# Patient Record
Sex: FEMALE | Race: WHITE | NOT HISPANIC OR LATINO
[De-identification: names, ages, dates, MRNs, and addresses within clinical notes are randomized per-mention and may not be internally consistent; named-entity substitution may affect disease eponyms.]

---

## 2017-07-27 ENCOUNTER — APPOINTMENT (OUTPATIENT)
Dept: RHEUMATOLOGY | Facility: CLINIC | Age: 72
End: 2017-07-27
Payer: MEDICARE

## 2017-07-27 VITALS
OXYGEN SATURATION: 98 % | BODY MASS INDEX: 23.39 KG/M2 | WEIGHT: 137 LBS | SYSTOLIC BLOOD PRESSURE: 125 MMHG | HEIGHT: 64 IN | HEART RATE: 67 BPM | DIASTOLIC BLOOD PRESSURE: 81 MMHG

## 2017-07-27 DIAGNOSIS — Z82.61 FAMILY HISTORY OF ARTHRITIS: ICD-10-CM

## 2017-07-27 DIAGNOSIS — M79.643 PAIN IN UNSPECIFIED HAND: ICD-10-CM

## 2017-07-27 PROCEDURE — 99205 OFFICE O/P NEW HI 60 MIN: CPT

## 2017-08-06 ENCOUNTER — FORM ENCOUNTER (OUTPATIENT)
Age: 72
End: 2017-08-06

## 2017-08-07 ENCOUNTER — OUTPATIENT (OUTPATIENT)
Dept: OUTPATIENT SERVICES | Facility: HOSPITAL | Age: 72
LOS: 1 days | End: 2017-08-07
Payer: MEDICARE

## 2017-08-07 ENCOUNTER — APPOINTMENT (OUTPATIENT)
Dept: MRI IMAGING | Facility: CLINIC | Age: 72
End: 2017-08-07
Payer: MEDICARE

## 2017-08-07 DIAGNOSIS — Z00.8 ENCOUNTER FOR OTHER GENERAL EXAMINATION: ICD-10-CM

## 2017-08-07 PROCEDURE — 73218 MRI UPPER EXTREMITY W/O DYE: CPT

## 2017-08-07 PROCEDURE — 73218 MRI UPPER EXTREMITY W/O DYE: CPT | Mod: 26,RT

## 2017-08-10 LAB
ALBUMIN MFR SERPL ELPH: 62.7 %
ALBUMIN SERPL ELPH-MCNC: 4.4 G/DL
ALBUMIN SERPL-MCNC: 4.3 G/DL
ALBUMIN/GLOB SERPL: 1.7 RATIO
ALP BLD-CCNC: 58 U/L
ALPHA1 GLOB MFR SERPL ELPH: 4.1 %
ALPHA1 GLOB SERPL ELPH-MCNC: 0.3 G/DL
ALPHA2 GLOB MFR SERPL ELPH: 8.7 %
ALPHA2 GLOB SERPL ELPH-MCNC: 0.6 G/DL
ALT SERPL-CCNC: 22 U/L
ANA SER IF-ACNC: NEGATIVE
ANION GAP SERPL CALC-SCNC: 14 MMOL/L
AST SERPL-CCNC: 16 U/L
B-GLOBULIN MFR SERPL ELPH: 10.5 %
B-GLOBULIN SERPL ELPH-MCNC: 0.7 G/DL
BASOPHILS # BLD AUTO: 0.02 K/UL
BASOPHILS NFR BLD AUTO: 0.5 %
BILIRUB SERPL-MCNC: 0.4 MG/DL
BUN SERPL-MCNC: 15 MG/DL
CALCIUM SERPL-MCNC: 9.2 MG/DL
CCP AB SER IA-ACNC: 37 UNITS
CHLORIDE SERPL-SCNC: 103 MMOL/L
CO2 SERPL-SCNC: 27 MMOL/L
CREAT SERPL-MCNC: 0.78 MG/DL
CRP SERPL-MCNC: <0.2 MG/DL
DEPRECATED KAPPA LC FREE/LAMBDA SER: 1.02 RATIO
EOSINOPHIL # BLD AUTO: 0.07 K/UL
EOSINOPHIL NFR BLD AUTO: 1.7 %
ERYTHROCYTE [SEDIMENTATION RATE] IN BLOOD BY WESTERGREN METHOD: 9 MM/HR
GAMMA GLOB FLD ELPH-MCNC: 1 G/DL
GAMMA GLOB MFR SERPL ELPH: 14 %
GLUCOSE SERPL-MCNC: 109 MG/DL
HCT VFR BLD CALC: 41.3 %
HGB BLD-MCNC: 13 G/DL
IGA SER QL IEP: 153 MG/DL
IGG SER QL IEP: 955 MG/DL
IGM SER QL IEP: 63 MG/DL
IMM GRANULOCYTES NFR BLD AUTO: 0 %
INTERPRETATION SERPL IEP-IMP: NORMAL
KAPPA LC CSF-MCNC: 1.76 MG/DL
KAPPA LC SERPL-MCNC: 1.79 MG/DL
LDH SERPL-CCNC: 95 U/L
LYMPHOCYTES # BLD AUTO: 1.25 K/UL
LYMPHOCYTES NFR BLD AUTO: 29.8 %
M PROTEIN SPEC IFE-MCNC: NORMAL
MAN DIFF?: NORMAL
MCHC RBC-ENTMCNC: 30.7 PG
MCHC RBC-ENTMCNC: 31.5 GM/DL
MCV RBC AUTO: 97.6 FL
MONOCYTES # BLD AUTO: 0.41 K/UL
MONOCYTES NFR BLD AUTO: 9.8 %
NEUTROPHILS # BLD AUTO: 2.44 K/UL
NEUTROPHILS NFR BLD AUTO: 58.2 %
PLATELET # BLD AUTO: 239 K/UL
POTASSIUM SERPL-SCNC: 4.3 MMOL/L
PROT SERPL-MCNC: 6.9 G/DL
RBC # BLD: 4.23 M/UL
RBC # FLD: 14 %
RF+CCP IGG SER-IMP: ABNORMAL
RHEUMATOID FACT SER QL: 7 IU/ML
SODIUM SERPL-SCNC: 144 MMOL/L
WBC # FLD AUTO: 4.19 K/UL

## 2017-08-11 ENCOUNTER — TRANSCRIPTION ENCOUNTER (OUTPATIENT)
Age: 72
End: 2017-08-11

## 2017-08-14 ENCOUNTER — APPOINTMENT (OUTPATIENT)
Dept: ULTRASOUND IMAGING | Facility: CLINIC | Age: 72
End: 2017-08-14
Payer: MEDICARE

## 2017-08-14 ENCOUNTER — OUTPATIENT (OUTPATIENT)
Dept: OUTPATIENT SERVICES | Facility: HOSPITAL | Age: 72
LOS: 1 days | End: 2017-08-14
Payer: MEDICARE

## 2017-08-14 DIAGNOSIS — Z00.8 ENCOUNTER FOR OTHER GENERAL EXAMINATION: ICD-10-CM

## 2017-08-14 PROCEDURE — 76700 US EXAM ABDOM COMPLETE: CPT

## 2017-08-14 PROCEDURE — 76700 US EXAM ABDOM COMPLETE: CPT | Mod: 26

## 2017-09-28 ENCOUNTER — APPOINTMENT (OUTPATIENT)
Dept: RHEUMATOLOGY | Facility: CLINIC | Age: 72
End: 2017-09-28
Payer: MEDICARE

## 2017-09-28 VITALS
DIASTOLIC BLOOD PRESSURE: 67 MMHG | HEART RATE: 67 BPM | BODY MASS INDEX: 22.88 KG/M2 | TEMPERATURE: 98.2 F | SYSTOLIC BLOOD PRESSURE: 107 MMHG | HEIGHT: 64 IN | OXYGEN SATURATION: 98 % | WEIGHT: 134 LBS

## 2017-09-28 PROCEDURE — 99214 OFFICE O/P EST MOD 30 MIN: CPT

## 2017-09-29 LAB
ALBUMIN SERPL ELPH-MCNC: 4.3 G/DL
ALP BLD-CCNC: 49 U/L
ALT SERPL-CCNC: 14 U/L
ANION GAP SERPL CALC-SCNC: 13 MMOL/L
AST SERPL-CCNC: 19 U/L
BASOPHILS # BLD AUTO: 0.02 K/UL
BASOPHILS NFR BLD AUTO: 0.4 %
BILIRUB SERPL-MCNC: 0.4 MG/DL
BUN SERPL-MCNC: 17 MG/DL
CALCIUM SERPL-MCNC: 9.5 MG/DL
CHLORIDE SERPL-SCNC: 101 MMOL/L
CO2 SERPL-SCNC: 28 MMOL/L
CREAT SERPL-MCNC: 1 MG/DL
CRP SERPL-MCNC: <0.2 MG/DL
EOSINOPHIL # BLD AUTO: 0.07 K/UL
EOSINOPHIL NFR BLD AUTO: 1.3 %
ERYTHROCYTE [SEDIMENTATION RATE] IN BLOOD BY WESTERGREN METHOD: 7 MM/HR
GLUCOSE SERPL-MCNC: 103 MG/DL
HCT VFR BLD CALC: 39.4 %
HGB BLD-MCNC: 12.9 G/DL
IMM GRANULOCYTES NFR BLD AUTO: 0.2 %
LYMPHOCYTES # BLD AUTO: 1.74 K/UL
LYMPHOCYTES NFR BLD AUTO: 31.9 %
MAN DIFF?: NORMAL
MCHC RBC-ENTMCNC: 32 PG
MCHC RBC-ENTMCNC: 32.7 GM/DL
MCV RBC AUTO: 97.8 FL
MONOCYTES # BLD AUTO: 0.51 K/UL
MONOCYTES NFR BLD AUTO: 9.3 %
NEUTROPHILS # BLD AUTO: 3.11 K/UL
NEUTROPHILS NFR BLD AUTO: 56.9 %
PLATELET # BLD AUTO: 241 K/UL
POTASSIUM SERPL-SCNC: 3.6 MMOL/L
PROT SERPL-MCNC: 7.3 G/DL
RBC # BLD: 4.03 M/UL
RBC # FLD: 13.6 %
SODIUM SERPL-SCNC: 142 MMOL/L
WBC # FLD AUTO: 5.46 K/UL

## 2017-11-15 ENCOUNTER — RX RENEWAL (OUTPATIENT)
Age: 72
End: 2017-11-15

## 2017-11-28 ENCOUNTER — APPOINTMENT (OUTPATIENT)
Dept: RHEUMATOLOGY | Facility: CLINIC | Age: 72
End: 2017-11-28
Payer: MEDICARE

## 2017-11-28 VITALS
SYSTOLIC BLOOD PRESSURE: 115 MMHG | BODY MASS INDEX: 23.22 KG/M2 | OXYGEN SATURATION: 98 % | HEART RATE: 74 BPM | TEMPERATURE: 97.7 F | DIASTOLIC BLOOD PRESSURE: 76 MMHG | WEIGHT: 136 LBS | HEIGHT: 64 IN

## 2017-11-28 DIAGNOSIS — M54.9 DORSALGIA, UNSPECIFIED: ICD-10-CM

## 2017-11-28 PROCEDURE — 99214 OFFICE O/P EST MOD 30 MIN: CPT

## 2017-11-29 LAB
ALBUMIN SERPL ELPH-MCNC: 4 G/DL
ALP BLD-CCNC: 43 U/L
ALT SERPL-CCNC: 18 U/L
ANION GAP SERPL CALC-SCNC: 15 MMOL/L
AST SERPL-CCNC: 21 U/L
BASOPHILS # BLD AUTO: 0.04 K/UL
BASOPHILS NFR BLD AUTO: 1 %
BILIRUB SERPL-MCNC: 0.3 MG/DL
BUN SERPL-MCNC: 21 MG/DL
CALCIUM SERPL-MCNC: 9.1 MG/DL
CHLORIDE SERPL-SCNC: 103 MMOL/L
CO2 SERPL-SCNC: 27 MMOL/L
CREAT SERPL-MCNC: 0.75 MG/DL
CRP SERPL-MCNC: <0.2 MG/DL
EOSINOPHIL # BLD AUTO: 0.11 K/UL
EOSINOPHIL NFR BLD AUTO: 2.7 %
ERYTHROCYTE [SEDIMENTATION RATE] IN BLOOD BY WESTERGREN METHOD: 9 MM/HR
GLUCOSE SERPL-MCNC: 107 MG/DL
HCT VFR BLD CALC: 39.2 %
HGB BLD-MCNC: 12.4 G/DL
IMM GRANULOCYTES NFR BLD AUTO: 0 %
LYMPHOCYTES # BLD AUTO: 1.27 K/UL
LYMPHOCYTES NFR BLD AUTO: 30.8 %
MAN DIFF?: NORMAL
MCHC RBC-ENTMCNC: 31.6 GM/DL
MCHC RBC-ENTMCNC: 32.2 PG
MCV RBC AUTO: 101.8 FL
MONOCYTES # BLD AUTO: 0.34 K/UL
MONOCYTES NFR BLD AUTO: 8.2 %
NEUTROPHILS # BLD AUTO: 2.37 K/UL
NEUTROPHILS NFR BLD AUTO: 57.3 %
PLATELET # BLD AUTO: 215 K/UL
POTASSIUM SERPL-SCNC: 4.2 MMOL/L
PROT SERPL-MCNC: 7.1 G/DL
RBC # BLD: 3.85 M/UL
RBC # FLD: 15.1 %
SODIUM SERPL-SCNC: 145 MMOL/L
WBC # FLD AUTO: 4.13 K/UL

## 2017-12-01 ENCOUNTER — FORM ENCOUNTER (OUTPATIENT)
Age: 72
End: 2017-12-01

## 2017-12-02 ENCOUNTER — OUTPATIENT (OUTPATIENT)
Dept: OUTPATIENT SERVICES | Facility: HOSPITAL | Age: 72
LOS: 1 days | End: 2017-12-02
Payer: MEDICARE

## 2017-12-02 ENCOUNTER — APPOINTMENT (OUTPATIENT)
Dept: MRI IMAGING | Facility: CLINIC | Age: 72
End: 2017-12-02
Payer: MEDICARE

## 2017-12-02 DIAGNOSIS — M54.9 DORSALGIA, UNSPECIFIED: ICD-10-CM

## 2017-12-02 PROCEDURE — 73221 MRI JOINT UPR EXTREM W/O DYE: CPT | Mod: 26,RT

## 2017-12-02 PROCEDURE — 73221 MRI JOINT UPR EXTREM W/O DYE: CPT

## 2017-12-13 ENCOUNTER — APPOINTMENT (OUTPATIENT)
Dept: RHEUMATOLOGY | Facility: CLINIC | Age: 72
End: 2017-12-13

## 2017-12-13 ENCOUNTER — APPOINTMENT (OUTPATIENT)
Dept: RHEUMATOLOGY | Facility: CLINIC | Age: 72
End: 2017-12-13
Payer: MEDICARE

## 2017-12-13 VITALS
HEIGHT: 64 IN | WEIGHT: 136 LBS | SYSTOLIC BLOOD PRESSURE: 116 MMHG | RESPIRATION RATE: 16 BRPM | OXYGEN SATURATION: 98 % | DIASTOLIC BLOOD PRESSURE: 72 MMHG | TEMPERATURE: 97.8 F | HEART RATE: 76 BPM | BODY MASS INDEX: 23.22 KG/M2

## 2017-12-13 PROCEDURE — 99214 OFFICE O/P EST MOD 30 MIN: CPT

## 2017-12-13 RX ORDER — NEOMYCIN SULFATE, POLYMYXIN B SULFATE AND DEXAMETHASONE 3.5; 10000; 1 MG/ML; [USP'U]/ML; MG/ML
3.5-10000-0.1 SUSPENSION OPHTHALMIC
Qty: 5 | Refills: 0 | Status: COMPLETED | COMMUNITY
Start: 2017-11-12

## 2017-12-13 RX ORDER — METHYLPREDNISOLONE 4 MG/1
4 TABLET ORAL
Qty: 21 | Refills: 0 | Status: COMPLETED | COMMUNITY
Start: 2017-12-10

## 2017-12-13 RX ORDER — CEPHALEXIN 500 MG/1
500 CAPSULE ORAL
Qty: 14 | Refills: 0 | Status: COMPLETED | COMMUNITY
Start: 2017-07-12

## 2017-12-13 RX ORDER — ZOLPIDEM TARTRATE 10 MG/1
10 TABLET ORAL
Qty: 30 | Refills: 0 | Status: COMPLETED | COMMUNITY
Start: 2017-11-20

## 2017-12-14 LAB
ALBUMIN SERPL ELPH-MCNC: 4.6 G/DL
ALP BLD-CCNC: 54 U/L
ALT SERPL-CCNC: 18 U/L
ANION GAP SERPL CALC-SCNC: 11 MMOL/L
AST SERPL-CCNC: 17 U/L
BASOPHILS # BLD AUTO: 0.03 K/UL
BASOPHILS NFR BLD AUTO: 0.5 %
BILIRUB SERPL-MCNC: 0.3 MG/DL
BUN SERPL-MCNC: 21 MG/DL
CALCIUM SERPL-MCNC: 9.9 MG/DL
CHLORIDE SERPL-SCNC: 101 MMOL/L
CO2 SERPL-SCNC: 30 MMOL/L
CREAT SERPL-MCNC: 0.73 MG/DL
EOSINOPHIL # BLD AUTO: 0.11 K/UL
EOSINOPHIL NFR BLD AUTO: 1.8 %
GLUCOSE SERPL-MCNC: 106 MG/DL
HCT VFR BLD CALC: 39.5 %
HGB BLD-MCNC: 12.7 G/DL
IMM GRANULOCYTES NFR BLD AUTO: 0.2 %
LYMPHOCYTES # BLD AUTO: 1.77 K/UL
LYMPHOCYTES NFR BLD AUTO: 28.2 %
MAN DIFF?: NORMAL
MCHC RBC-ENTMCNC: 32.2 GM/DL
MCHC RBC-ENTMCNC: 32.2 PG
MCV RBC AUTO: 100 FL
MONOCYTES # BLD AUTO: 0.56 K/UL
MONOCYTES NFR BLD AUTO: 8.9 %
NEUTROPHILS # BLD AUTO: 3.8 K/UL
NEUTROPHILS NFR BLD AUTO: 60.4 %
PLATELET # BLD AUTO: 279 K/UL
POTASSIUM SERPL-SCNC: 4.4 MMOL/L
PROT SERPL-MCNC: 7.1 G/DL
RBC # BLD: 3.95 M/UL
RBC # FLD: 14.2 %
SODIUM SERPL-SCNC: 142 MMOL/L
WBC # FLD AUTO: 6.28 K/UL

## 2017-12-15 ENCOUNTER — APPOINTMENT (OUTPATIENT)
Dept: RHEUMATOLOGY | Facility: CLINIC | Age: 72
End: 2017-12-15

## 2017-12-21 ENCOUNTER — APPOINTMENT (OUTPATIENT)
Dept: RHEUMATOLOGY | Facility: CLINIC | Age: 72
End: 2017-12-21
Payer: MEDICARE

## 2017-12-21 VITALS
OXYGEN SATURATION: 98 % | DIASTOLIC BLOOD PRESSURE: 90 MMHG | RESPIRATION RATE: 16 BRPM | BODY MASS INDEX: 23.22 KG/M2 | TEMPERATURE: 97.8 F | HEART RATE: 80 BPM | WEIGHT: 136 LBS | HEIGHT: 64 IN | SYSTOLIC BLOOD PRESSURE: 150 MMHG

## 2017-12-21 PROCEDURE — 99214 OFFICE O/P EST MOD 30 MIN: CPT

## 2017-12-21 RX ORDER — PREDNISONE 5 MG/1
5 TABLET ORAL
Qty: 45 | Refills: 0 | Status: DISCONTINUED | COMMUNITY
Start: 2017-12-13 | End: 2017-12-21

## 2018-01-02 ENCOUNTER — TRANSCRIPTION ENCOUNTER (OUTPATIENT)
Age: 73
End: 2018-01-02

## 2018-01-02 LAB — G6PD SER-CCNC: 14.4 U/G HGB

## 2018-01-08 ENCOUNTER — CLINICAL ADVICE (OUTPATIENT)
Age: 73
End: 2018-01-08

## 2018-01-16 ENCOUNTER — APPOINTMENT (OUTPATIENT)
Dept: RHEUMATOLOGY | Facility: CLINIC | Age: 73
End: 2018-01-16
Payer: MEDICARE

## 2018-01-16 VITALS
SYSTOLIC BLOOD PRESSURE: 136 MMHG | BODY MASS INDEX: 23.22 KG/M2 | HEART RATE: 78 BPM | WEIGHT: 136 LBS | HEIGHT: 64 IN | RESPIRATION RATE: 16 BRPM | DIASTOLIC BLOOD PRESSURE: 79 MMHG | TEMPERATURE: 97.8 F | OXYGEN SATURATION: 98 %

## 2018-01-16 DIAGNOSIS — M25.512 PAIN IN LEFT SHOULDER: ICD-10-CM

## 2018-01-16 PROCEDURE — 96372 THER/PROPH/DIAG INJ SC/IM: CPT

## 2018-01-16 PROCEDURE — 99214 OFFICE O/P EST MOD 30 MIN: CPT | Mod: 25

## 2018-01-16 RX ORDER — LIDOCAINE HYDROCHLORIDE 10 MG/ML
1 INJECTION, SOLUTION INFILTRATION; PERINEURAL
Refills: 0 | Status: COMPLETED | OUTPATIENT
Start: 2018-01-16

## 2018-01-16 RX ORDER — METHYLPRED ACET/NACL,ISO-OS/PF 40 MG/ML
40 VIAL (ML) INJECTION
Qty: 1 | Refills: 0 | Status: COMPLETED | OUTPATIENT
Start: 2018-01-16

## 2018-01-16 RX ADMIN — LIDOCAINE HYDROCHLORIDE %: 10 INJECTION, SOLUTION INFILTRATION; PERINEURAL at 00:00

## 2018-01-16 RX ADMIN — METHYLPREDNISOLONE ACETATE 1 MG/ML: 40 INJECTION, SUSPENSION INTRA-ARTICULAR; INTRALESIONAL; INTRAMUSCULAR; SOFT TISSUE at 00:00

## 2018-03-06 ENCOUNTER — APPOINTMENT (OUTPATIENT)
Dept: RHEUMATOLOGY | Facility: CLINIC | Age: 73
End: 2018-03-06
Payer: MEDICARE

## 2018-03-06 DIAGNOSIS — M18.11 UNILATERAL PRIMARY OSTEOARTHRITIS OF FIRST CARPOMETACARPAL JOINT, RIGHT HAND: ICD-10-CM

## 2018-03-06 LAB
ALBUMIN SERPL ELPH-MCNC: 4.5 G/DL
ALP BLD-CCNC: 56 U/L
ALT SERPL-CCNC: 21 U/L
ANION GAP SERPL CALC-SCNC: 13 MMOL/L
AST SERPL-CCNC: 19 U/L
BASOPHILS # BLD AUTO: 0.03 K/UL
BASOPHILS NFR BLD AUTO: 0.7 %
BILIRUB SERPL-MCNC: 0.3 MG/DL
BUN SERPL-MCNC: 24 MG/DL
CALCIUM SERPL-MCNC: 9.5 MG/DL
CHLORIDE SERPL-SCNC: 102 MMOL/L
CO2 SERPL-SCNC: 26 MMOL/L
CREAT SERPL-MCNC: 0.88 MG/DL
CRP SERPL-MCNC: 1.1 MG/DL
EOSINOPHIL # BLD AUTO: 0.1 K/UL
EOSINOPHIL NFR BLD AUTO: 2.4 %
ERYTHROCYTE [SEDIMENTATION RATE] IN BLOOD BY WESTERGREN METHOD: 25 MM/HR
GLUCOSE SERPL-MCNC: 142 MG/DL
HCT VFR BLD CALC: 37.5 %
HGB BLD-MCNC: 12.1 G/DL
IMM GRANULOCYTES NFR BLD AUTO: 0.2 %
LYMPHOCYTES # BLD AUTO: 0.81 K/UL
LYMPHOCYTES NFR BLD AUTO: 19.5 %
MAN DIFF?: NORMAL
MCHC RBC-ENTMCNC: 32.3 GM/DL
MCHC RBC-ENTMCNC: 32.6 PG
MCV RBC AUTO: 101.1 FL
MONOCYTES # BLD AUTO: 0.43 K/UL
MONOCYTES NFR BLD AUTO: 10.4 %
NEUTROPHILS # BLD AUTO: 2.77 K/UL
NEUTROPHILS NFR BLD AUTO: 66.8 %
PLATELET # BLD AUTO: 257 K/UL
POTASSIUM SERPL-SCNC: 4.4 MMOL/L
PROT SERPL-MCNC: 6.7 G/DL
RBC # BLD: 3.71 M/UL
RBC # FLD: 14.4 %
SODIUM SERPL-SCNC: 141 MMOL/L
WBC # FLD AUTO: 4.15 K/UL

## 2018-03-06 PROCEDURE — 99214 OFFICE O/P EST MOD 30 MIN: CPT

## 2018-03-06 RX ORDER — PREDNISONE 5 MG/1
5 TABLET ORAL
Qty: 90 | Refills: 0 | Status: DISCONTINUED | COMMUNITY
Start: 2017-12-21 | End: 2018-03-06

## 2018-03-06 RX ORDER — IBUPROFEN 200 MG/1
200 TABLET, COATED ORAL
Qty: 180 | Refills: 0 | Status: DISCONTINUED | OUTPATIENT
Start: 2017-11-28 | End: 2018-03-06

## 2018-04-04 ENCOUNTER — RX RENEWAL (OUTPATIENT)
Age: 73
End: 2018-04-04

## 2018-04-30 ENCOUNTER — APPOINTMENT (OUTPATIENT)
Dept: RHEUMATOLOGY | Facility: CLINIC | Age: 73
End: 2018-04-30
Payer: MEDICARE

## 2018-04-30 VITALS
HEART RATE: 79 BPM | TEMPERATURE: 98.3 F | WEIGHT: 141 LBS | HEIGHT: 64 IN | DIASTOLIC BLOOD PRESSURE: 69 MMHG | BODY MASS INDEX: 24.07 KG/M2 | OXYGEN SATURATION: 97 % | SYSTOLIC BLOOD PRESSURE: 125 MMHG

## 2018-04-30 PROCEDURE — 99214 OFFICE O/P EST MOD 30 MIN: CPT

## 2018-05-03 LAB
ADJUSTED MITOGEN: >10 IU/ML
ADJUSTED TB AG: 0 IU/ML
ALBUMIN SERPL ELPH-MCNC: 4.4 G/DL
ALP BLD-CCNC: 59 U/L
ALT SERPL-CCNC: 19 U/L
ANION GAP SERPL CALC-SCNC: 10 MMOL/L
AST SERPL-CCNC: 20 U/L
BASOPHILS # BLD AUTO: 0.03 K/UL
BASOPHILS NFR BLD AUTO: 0.6 %
BILIRUB SERPL-MCNC: 0.3 MG/DL
BUN SERPL-MCNC: 23 MG/DL
CALCIUM SERPL-MCNC: 9.2 MG/DL
CHLORIDE SERPL-SCNC: 103 MMOL/L
CO2 SERPL-SCNC: 29 MMOL/L
CREAT SERPL-MCNC: 0.69 MG/DL
CRP SERPL-MCNC: <0.2 MG/DL
EOSINOPHIL # BLD AUTO: 0.13 K/UL
EOSINOPHIL NFR BLD AUTO: 2.7 %
GLUCOSE SERPL-MCNC: 107 MG/DL
HAV IGM SER QL: NONREACTIVE
HBV CORE IGG+IGM SER QL: NONREACTIVE
HBV CORE IGM SER QL: NONREACTIVE
HBV SURFACE AG SER QL: NONREACTIVE
HCT VFR BLD CALC: 38.1 %
HCV AB SER QL: NONREACTIVE
HCV S/CO RATIO: 0.09 S/CO
HGB BLD-MCNC: 12.1 G/DL
IMM GRANULOCYTES NFR BLD AUTO: 0.2 %
LYMPHOCYTES # BLD AUTO: 1.22 K/UL
LYMPHOCYTES NFR BLD AUTO: 25.5 %
M TB IFN-G BLD-IMP: NEGATIVE
MAN DIFF?: NORMAL
MCHC RBC-ENTMCNC: 31.8 GM/DL
MCHC RBC-ENTMCNC: 32.5 PG
MCV RBC AUTO: 102.4 FL
MONOCYTES # BLD AUTO: 0.42 K/UL
MONOCYTES NFR BLD AUTO: 8.8 %
NEUTROPHILS # BLD AUTO: 2.98 K/UL
NEUTROPHILS NFR BLD AUTO: 62.2 %
PLATELET # BLD AUTO: 244 K/UL
POTASSIUM SERPL-SCNC: 4.2 MMOL/L
PROT SERPL-MCNC: 6.7 G/DL
QUANTIFERON GOLD NIL: 0.04 IU/ML
RBC # BLD: 3.72 M/UL
RBC # FLD: 14.2 %
SODIUM SERPL-SCNC: 142 MMOL/L
WBC # FLD AUTO: 4.79 K/UL

## 2018-05-24 ENCOUNTER — LABORATORY RESULT (OUTPATIENT)
Age: 73
End: 2018-05-24

## 2018-05-24 ENCOUNTER — APPOINTMENT (OUTPATIENT)
Dept: RHEUMATOLOGY | Facility: CLINIC | Age: 73
End: 2018-05-24
Payer: MEDICARE

## 2018-05-24 PROCEDURE — 96415 CHEMO IV INFUSION ADDL HR: CPT

## 2018-05-24 PROCEDURE — 96375 TX/PRO/DX INJ NEW DRUG ADDON: CPT

## 2018-05-24 PROCEDURE — 36415 COLL VENOUS BLD VENIPUNCTURE: CPT

## 2018-05-24 PROCEDURE — 96413 CHEMO IV INFUSION 1 HR: CPT

## 2018-05-25 ENCOUNTER — OTHER (OUTPATIENT)
Age: 73
End: 2018-05-25

## 2018-05-27 ENCOUNTER — TRANSCRIPTION ENCOUNTER (OUTPATIENT)
Age: 73
End: 2018-05-27

## 2018-05-31 ENCOUNTER — RX RENEWAL (OUTPATIENT)
Age: 73
End: 2018-05-31

## 2018-06-04 ENCOUNTER — CLINICAL ADVICE (OUTPATIENT)
Age: 73
End: 2018-06-04

## 2018-06-07 ENCOUNTER — APPOINTMENT (OUTPATIENT)
Dept: RHEUMATOLOGY | Facility: CLINIC | Age: 73
End: 2018-06-07

## 2018-06-08 ENCOUNTER — APPOINTMENT (OUTPATIENT)
Dept: RHEUMATOLOGY | Facility: CLINIC | Age: 73
End: 2018-06-08

## 2018-06-11 ENCOUNTER — LABORATORY RESULT (OUTPATIENT)
Age: 73
End: 2018-06-11

## 2018-06-11 ENCOUNTER — APPOINTMENT (OUTPATIENT)
Dept: RHEUMATOLOGY | Facility: CLINIC | Age: 73
End: 2018-06-11

## 2018-06-11 ENCOUNTER — APPOINTMENT (OUTPATIENT)
Dept: RHEUMATOLOGY | Facility: CLINIC | Age: 73
End: 2018-06-11
Payer: MEDICARE

## 2018-06-11 PROCEDURE — 96415 CHEMO IV INFUSION ADDL HR: CPT

## 2018-06-11 PROCEDURE — 36415 COLL VENOUS BLD VENIPUNCTURE: CPT

## 2018-06-11 PROCEDURE — 96375 TX/PRO/DX INJ NEW DRUG ADDON: CPT

## 2018-06-11 PROCEDURE — 96413 CHEMO IV INFUSION 1 HR: CPT

## 2018-06-21 ENCOUNTER — RX RENEWAL (OUTPATIENT)
Age: 73
End: 2018-06-21

## 2018-06-22 ENCOUNTER — APPOINTMENT (OUTPATIENT)
Dept: RHEUMATOLOGY | Facility: CLINIC | Age: 73
End: 2018-06-22
Payer: MEDICARE

## 2018-06-22 VITALS
BODY MASS INDEX: 24.75 KG/M2 | HEART RATE: 80 BPM | DIASTOLIC BLOOD PRESSURE: 84 MMHG | HEIGHT: 64 IN | TEMPERATURE: 98.4 F | RESPIRATION RATE: 16 BRPM | OXYGEN SATURATION: 97 % | WEIGHT: 145 LBS | SYSTOLIC BLOOD PRESSURE: 134 MMHG

## 2018-06-22 DIAGNOSIS — M54.12 RADICULOPATHY, CERVICAL REGION: ICD-10-CM

## 2018-06-22 DIAGNOSIS — M79.643 PAIN IN UNSPECIFIED HAND: ICD-10-CM

## 2018-06-22 PROCEDURE — 99215 OFFICE O/P EST HI 40 MIN: CPT

## 2018-06-22 RX ORDER — NYSTATIN 100000 [USP'U]/ML
100000 SUSPENSION ORAL
Qty: 160 | Refills: 0 | Status: COMPLETED | COMMUNITY
Start: 2018-05-27

## 2018-06-22 RX ORDER — AZITHROMYCIN 250 MG/1
250 TABLET, FILM COATED ORAL
Qty: 6 | Refills: 0 | Status: COMPLETED | COMMUNITY
Start: 2018-05-29

## 2018-06-22 RX ORDER — MELOXICAM 7.5 MG/1
7.5 TABLET ORAL
Qty: 180 | Refills: 0 | Status: DISCONTINUED | COMMUNITY
Start: 2018-03-06 | End: 2018-06-22

## 2018-06-25 LAB
ALBUMIN SERPL ELPH-MCNC: 4.5 G/DL
ALP BLD-CCNC: 55 U/L
ALT SERPL-CCNC: 15 U/L
ANION GAP SERPL CALC-SCNC: 15 MMOL/L
AST SERPL-CCNC: 18 U/L
BASOPHILS # BLD AUTO: 0.04 K/UL
BASOPHILS NFR BLD AUTO: 0.8 %
BILIRUB SERPL-MCNC: 0.4 MG/DL
BUN SERPL-MCNC: 15 MG/DL
CALCIUM SERPL-MCNC: 9.8 MG/DL
CELLS.CD3-CD19+/CELLS IN BLOOD: 0 %
CHLORIDE SERPL-SCNC: 100 MMOL/L
CO2 SERPL-SCNC: 27 MMOL/L
CREAT SERPL-MCNC: 0.85 MG/DL
CRP SERPL-MCNC: <0.2 MG/DL
EOSINOPHIL # BLD AUTO: 0.05 K/UL
EOSINOPHIL NFR BLD AUTO: 1 %
ERYTHROCYTE [SEDIMENTATION RATE] IN BLOOD BY WESTERGREN METHOD: 6 MM/HR
GLUCOSE SERPL-MCNC: 92 MG/DL
HCT VFR BLD CALC: 37.6 %
HGB BLD-MCNC: 12.4 G/DL
IMM GRANULOCYTES NFR BLD AUTO: 0 %
LYMPHOCYTES # BLD AUTO: 1.44 K/UL
LYMPHOCYTES NFR BLD AUTO: 28.7 %
MAN DIFF?: NORMAL
MCHC RBC-ENTMCNC: 32 PG
MCHC RBC-ENTMCNC: 33 GM/DL
MCV RBC AUTO: 96.9 FL
MONOCYTES # BLD AUTO: 0.38 K/UL
MONOCYTES NFR BLD AUTO: 7.6 %
NEUTROPHILS # BLD AUTO: 3.1 K/UL
NEUTROPHILS NFR BLD AUTO: 61.9 %
PLATELET # BLD AUTO: 237 K/UL
POTASSIUM SERPL-SCNC: 4.2 MMOL/L
PROT SERPL-MCNC: 7 G/DL
RBC # BLD: 3.88 M/UL
RBC # FLD: 15.4 %
SODIUM SERPL-SCNC: 142 MMOL/L
WBC # FLD AUTO: 5.01 K/UL

## 2018-07-26 ENCOUNTER — FORM ENCOUNTER (OUTPATIENT)
Age: 73
End: 2018-07-26

## 2018-07-27 ENCOUNTER — APPOINTMENT (OUTPATIENT)
Dept: RHEUMATOLOGY | Facility: CLINIC | Age: 73
End: 2018-07-27
Payer: MEDICARE

## 2018-07-27 VITALS
SYSTOLIC BLOOD PRESSURE: 145 MMHG | RESPIRATION RATE: 16 BRPM | OXYGEN SATURATION: 97 % | WEIGHT: 143 LBS | HEART RATE: 84 BPM | TEMPERATURE: 98 F | BODY MASS INDEX: 24.41 KG/M2 | HEIGHT: 64 IN | DIASTOLIC BLOOD PRESSURE: 78 MMHG

## 2018-07-27 PROCEDURE — 99215 OFFICE O/P EST HI 40 MIN: CPT

## 2018-07-27 PROCEDURE — 71046 X-RAY EXAM CHEST 2 VIEWS: CPT

## 2018-07-27 RX ORDER — PREDNISONE 2.5 MG/1
2.5 TABLET ORAL
Qty: 42 | Refills: 1 | Status: DISCONTINUED | COMMUNITY
Start: 2018-04-30 | End: 2018-07-27

## 2018-07-28 DIAGNOSIS — J40 BRONCHITIS, NOT SPECIFIED AS ACUTE OR CHRONIC: ICD-10-CM

## 2018-07-28 LAB
ALBUMIN SERPL ELPH-MCNC: 4.4 G/DL
ALP BLD-CCNC: 55 U/L
ALT SERPL-CCNC: 34 U/L
ANION GAP SERPL CALC-SCNC: 15 MMOL/L
AST SERPL-CCNC: 25 U/L
BASOPHILS # BLD AUTO: 0.02 K/UL
BASOPHILS NFR BLD AUTO: 0.2 %
BILIRUB SERPL-MCNC: 0.3 MG/DL
BUN SERPL-MCNC: 21 MG/DL
CALCIUM SERPL-MCNC: 9.5 MG/DL
CHLORIDE SERPL-SCNC: 104 MMOL/L
CO2 SERPL-SCNC: 27 MMOL/L
CREAT SERPL-MCNC: 0.83 MG/DL
CRP SERPL-MCNC: 0.66 MG/DL
EOSINOPHIL # BLD AUTO: 0.05 K/UL
EOSINOPHIL NFR BLD AUTO: 0.5 %
ERYTHROCYTE [SEDIMENTATION RATE] IN BLOOD BY WESTERGREN METHOD: 6 MM/HR
GLUCOSE SERPL-MCNC: 94 MG/DL
HCT VFR BLD CALC: 37.4 %
HGB BLD-MCNC: 12 G/DL
IMM GRANULOCYTES NFR BLD AUTO: 0.3 %
LYMPHOCYTES # BLD AUTO: 1.31 K/UL
LYMPHOCYTES NFR BLD AUTO: 12.9 %
MAN DIFF?: NORMAL
MCHC RBC-ENTMCNC: 32.1 GM/DL
MCHC RBC-ENTMCNC: 32.3 PG
MCV RBC AUTO: 100.5 FL
MONOCYTES # BLD AUTO: 0.9 K/UL
MONOCYTES NFR BLD AUTO: 8.9 %
NEUTROPHILS # BLD AUTO: 7.83 K/UL
NEUTROPHILS NFR BLD AUTO: 77.2 %
PLATELET # BLD AUTO: 319 K/UL
POTASSIUM SERPL-SCNC: 3.8 MMOL/L
PROT SERPL-MCNC: 6.9 G/DL
RBC # BLD: 3.72 M/UL
RBC # FLD: 15.4 %
SODIUM SERPL-SCNC: 146 MMOL/L
TSH SERPL-ACNC: 3.97 UIU/ML
WBC # FLD AUTO: 10.14 K/UL

## 2018-07-30 ENCOUNTER — CLINICAL ADVICE (OUTPATIENT)
Age: 73
End: 2018-07-30

## 2018-10-19 ENCOUNTER — APPOINTMENT (OUTPATIENT)
Dept: RHEUMATOLOGY | Facility: CLINIC | Age: 73
End: 2018-10-19
Payer: MEDICARE

## 2018-10-19 VITALS
OXYGEN SATURATION: 97 % | HEART RATE: 88 BPM | WEIGHT: 131 LBS | SYSTOLIC BLOOD PRESSURE: 134 MMHG | BODY MASS INDEX: 22.36 KG/M2 | HEIGHT: 64 IN | DIASTOLIC BLOOD PRESSURE: 78 MMHG | TEMPERATURE: 98.4 F | RESPIRATION RATE: 16 BRPM

## 2018-10-19 DIAGNOSIS — M54.2 CERVICALGIA: ICD-10-CM

## 2018-10-19 DIAGNOSIS — G89.29 CERVICALGIA: ICD-10-CM

## 2018-10-19 PROCEDURE — 99215 OFFICE O/P EST HI 40 MIN: CPT

## 2018-10-20 LAB
ALBUMIN SERPL ELPH-MCNC: 4.7 G/DL
ALP BLD-CCNC: 56 U/L
ALT SERPL-CCNC: 18 U/L
ANION GAP SERPL CALC-SCNC: 13 MMOL/L
AST SERPL-CCNC: 18 U/L
BASOPHILS # BLD AUTO: 0.05 K/UL
BASOPHILS NFR BLD AUTO: 1.1 %
BILIRUB SERPL-MCNC: 0.4 MG/DL
BUN SERPL-MCNC: 12 MG/DL
CALCIUM SERPL-MCNC: 9.7 MG/DL
CELLS.CD3-CD19+/CELLS IN BLOOD: 0 %
CHLORIDE SERPL-SCNC: 101 MMOL/L
CO2 SERPL-SCNC: 29 MMOL/L
CREAT SERPL-MCNC: 0.7 MG/DL
CRP SERPL-MCNC: <0.1 MG/DL
EOSINOPHIL # BLD AUTO: 0.04 K/UL
EOSINOPHIL NFR BLD AUTO: 0.9 %
ERYTHROCYTE [SEDIMENTATION RATE] IN BLOOD BY WESTERGREN METHOD: 11 MM/HR
GLUCOSE SERPL-MCNC: 94 MG/DL
HCT VFR BLD CALC: 39.8 %
HGB BLD-MCNC: 12.7 G/DL
IGA SER QL IEP: 149 MG/DL
IGG SER QL IEP: 901 MG/DL
IGM SER QL IEP: 43 MG/DL
IMM GRANULOCYTES NFR BLD AUTO: 0 %
LYMPHOCYTES # BLD AUTO: 1.41 K/UL
LYMPHOCYTES NFR BLD AUTO: 30 %
MAN DIFF?: NORMAL
MCHC RBC-ENTMCNC: 31.1 PG
MCHC RBC-ENTMCNC: 31.9 GM/DL
MCV RBC AUTO: 97.5 FL
MONOCYTES # BLD AUTO: 0.68 K/UL
MONOCYTES NFR BLD AUTO: 14.5 %
NEUTROPHILS # BLD AUTO: 2.52 K/UL
NEUTROPHILS NFR BLD AUTO: 53.5 %
PLATELET # BLD AUTO: 262 K/UL
POTASSIUM SERPL-SCNC: 4.2 MMOL/L
PROT SERPL-MCNC: 6.8 G/DL
RBC # BLD: 4.08 M/UL
RBC # FLD: 14.5 %
SODIUM SERPL-SCNC: 143 MMOL/L
WBC # FLD AUTO: 4.7 K/UL

## 2019-01-17 ENCOUNTER — APPOINTMENT (OUTPATIENT)
Dept: RHEUMATOLOGY | Facility: CLINIC | Age: 74
End: 2019-01-17
Payer: MEDICARE

## 2019-01-17 VITALS
BODY MASS INDEX: 21.68 KG/M2 | WEIGHT: 127 LBS | TEMPERATURE: 98.1 F | HEIGHT: 64 IN | DIASTOLIC BLOOD PRESSURE: 76 MMHG | OXYGEN SATURATION: 98 % | SYSTOLIC BLOOD PRESSURE: 150 MMHG | HEART RATE: 80 BPM | RESPIRATION RATE: 16 BRPM

## 2019-01-17 PROCEDURE — 99214 OFFICE O/P EST MOD 30 MIN: CPT

## 2019-01-18 LAB
25(OH)D3 SERPL-MCNC: 38.3 NG/ML
ALBUMIN SERPL ELPH-MCNC: 4.3 G/DL
ALP BLD-CCNC: 60 U/L
ALT SERPL-CCNC: 28 U/L
ANION GAP SERPL CALC-SCNC: 11 MMOL/L
AST SERPL-CCNC: 28 U/L
BASOPHILS # BLD AUTO: 0.03 K/UL
BASOPHILS NFR BLD AUTO: 0.6 %
BILIRUB SERPL-MCNC: 0.3 MG/DL
BUN SERPL-MCNC: 16 MG/DL
CALCIUM SERPL-MCNC: 9.3 MG/DL
CELLS.CD3-CD19+/CELLS IN BLOOD: 1 %
CHLORIDE SERPL-SCNC: 103 MMOL/L
CO2 SERPL-SCNC: 28 MMOL/L
CREAT SERPL-MCNC: 0.74 MG/DL
CRP SERPL-MCNC: 0.45 MG/DL
EOSINOPHIL # BLD AUTO: 0.03 K/UL
EOSINOPHIL NFR BLD AUTO: 0.6 %
ERYTHROCYTE [SEDIMENTATION RATE] IN BLOOD BY WESTERGREN METHOD: 12 MM/HR
GLUCOSE SERPL-MCNC: 96 MG/DL
HCT VFR BLD CALC: 37.6 %
HGB BLD-MCNC: 12.3 G/DL
IGA SER QL IEP: 130 MG/DL
IGG SER QL IEP: 856 MG/DL
IGM SER QL IEP: 44 MG/DL
IMM GRANULOCYTES NFR BLD AUTO: 0.4 %
LYMPHOCYTES # BLD AUTO: 1.05 K/UL
LYMPHOCYTES NFR BLD AUTO: 21.2 %
MAN DIFF?: NORMAL
MCHC RBC-ENTMCNC: 32.5 PG
MCHC RBC-ENTMCNC: 32.7 GM/DL
MCV RBC AUTO: 99.2 FL
MONOCYTES # BLD AUTO: 0.49 K/UL
MONOCYTES NFR BLD AUTO: 9.9 %
NEUTROPHILS # BLD AUTO: 3.34 K/UL
NEUTROPHILS NFR BLD AUTO: 67.3 %
PLATELET # BLD AUTO: 209 K/UL
POTASSIUM SERPL-SCNC: 4.3 MMOL/L
PROT SERPL-MCNC: 7 G/DL
RBC # BLD: 3.79 M/UL
RBC # FLD: 14.2 %
SODIUM SERPL-SCNC: 142 MMOL/L
WBC # FLD AUTO: 4.96 K/UL

## 2019-01-28 ENCOUNTER — CLINICAL ADVICE (OUTPATIENT)
Age: 74
End: 2019-01-28

## 2019-02-07 ENCOUNTER — CLINICAL ADVICE (OUTPATIENT)
Age: 74
End: 2019-02-07

## 2019-03-13 ENCOUNTER — APPOINTMENT (OUTPATIENT)
Dept: RHEUMATOLOGY | Facility: CLINIC | Age: 74
End: 2019-03-13
Payer: MEDICARE

## 2019-03-13 VITALS
SYSTOLIC BLOOD PRESSURE: 123 MMHG | OXYGEN SATURATION: 98 % | WEIGHT: 138 LBS | BODY MASS INDEX: 23.56 KG/M2 | DIASTOLIC BLOOD PRESSURE: 78 MMHG | TEMPERATURE: 98.7 F | HEART RATE: 72 BPM | HEIGHT: 64 IN | RESPIRATION RATE: 16 BRPM

## 2019-03-13 PROCEDURE — 99215 OFFICE O/P EST HI 40 MIN: CPT

## 2019-03-13 RX ORDER — METHOTREXATE 20 MG/.4ML
20 INJECTION, SOLUTION SUBCUTANEOUS
Qty: 12 | Refills: 2 | Status: DISCONTINUED | COMMUNITY
Start: 2019-01-17 | End: 2019-03-13

## 2019-03-13 RX ORDER — LEVOFLOXACIN 500 MG/1
500 TABLET, FILM COATED ORAL DAILY
Qty: 7 | Refills: 0 | Status: DISCONTINUED | COMMUNITY
Start: 2018-07-28 | End: 2019-03-13

## 2019-03-13 RX ORDER — AZITHROMYCIN 250 MG/1
250 TABLET, FILM COATED ORAL
Qty: 1 | Refills: 0 | Status: DISCONTINUED | COMMUNITY
Start: 2019-01-17 | End: 2019-03-13

## 2019-03-14 LAB
25(OH)D3 SERPL-MCNC: 35.5 NG/ML
ALBUMIN SERPL ELPH-MCNC: 4.4 G/DL
ALP BLD-CCNC: 48 U/L
ALT SERPL-CCNC: 18 U/L
ANION GAP SERPL CALC-SCNC: 11 MMOL/L
AST SERPL-CCNC: 20 U/L
BASOPHILS # BLD AUTO: 0.03 K/UL
BASOPHILS NFR BLD AUTO: 0.6 %
BILIRUB SERPL-MCNC: 0.4 MG/DL
BUN SERPL-MCNC: 18 MG/DL
CALCIUM SERPL-MCNC: 9.3 MG/DL
CELLS.CD3-CD19+/CELLS IN BLOOD: 2 %
CHLORIDE SERPL-SCNC: 104 MMOL/L
CO2 SERPL-SCNC: 29 MMOL/L
CREAT SERPL-MCNC: 0.68 MG/DL
CRP SERPL-MCNC: <0.1 MG/DL
EOSINOPHIL # BLD AUTO: 0.05 K/UL
EOSINOPHIL NFR BLD AUTO: 1 %
ERYTHROCYTE [SEDIMENTATION RATE] IN BLOOD BY WESTERGREN METHOD: 2 MM/HR
GLUCOSE SERPL-MCNC: 119 MG/DL
HCT VFR BLD CALC: 37.6 %
HGB BLD-MCNC: 11.9 G/DL
IGA SER QL IEP: 115 MG/DL
IGG SER QL IEP: 775 MG/DL
IGM SER QL IEP: 34 MG/DL
IMM GRANULOCYTES NFR BLD AUTO: 0.2 %
LYMPHOCYTES # BLD AUTO: 1.16 K/UL
LYMPHOCYTES NFR BLD AUTO: 23.7 %
MAN DIFF?: NORMAL
MCHC RBC-ENTMCNC: 31.6 GM/DL
MCHC RBC-ENTMCNC: 32.8 PG
MCV RBC AUTO: 103.6 FL
MONOCYTES # BLD AUTO: 0.38 K/UL
MONOCYTES NFR BLD AUTO: 7.8 %
NEUTROPHILS # BLD AUTO: 3.27 K/UL
NEUTROPHILS NFR BLD AUTO: 66.7 %
PLATELET # BLD AUTO: 217 K/UL
POTASSIUM SERPL-SCNC: 3.7 MMOL/L
PROT SERPL-MCNC: 6.4 G/DL
RBC # BLD: 3.63 M/UL
RBC # FLD: 13.7 %
SODIUM SERPL-SCNC: 144 MMOL/L
TSH SERPL-ACNC: 1.9 UIU/ML
WBC # FLD AUTO: 4.9 K/UL

## 2019-03-20 ENCOUNTER — CHART COPY (OUTPATIENT)
Age: 74
End: 2019-03-20

## 2019-03-26 ENCOUNTER — RX RENEWAL (OUTPATIENT)
Age: 74
End: 2019-03-26

## 2019-04-16 ENCOUNTER — TRANSCRIPTION ENCOUNTER (OUTPATIENT)
Age: 74
End: 2019-04-16

## 2019-04-16 ENCOUNTER — RX RENEWAL (OUTPATIENT)
Age: 74
End: 2019-04-16

## 2019-04-26 ENCOUNTER — TRANSCRIPTION ENCOUNTER (OUTPATIENT)
Age: 74
End: 2019-04-26

## 2019-04-29 ENCOUNTER — APPOINTMENT (OUTPATIENT)
Dept: RHEUMATOLOGY | Facility: CLINIC | Age: 74
End: 2019-04-29
Payer: MEDICARE

## 2019-04-29 VITALS
RESPIRATION RATE: 16 BRPM | WEIGHT: 136 LBS | HEART RATE: 76 BPM | DIASTOLIC BLOOD PRESSURE: 78 MMHG | HEIGHT: 64 IN | SYSTOLIC BLOOD PRESSURE: 151 MMHG | TEMPERATURE: 98.2 F | BODY MASS INDEX: 23.22 KG/M2 | OXYGEN SATURATION: 98 %

## 2019-04-29 PROCEDURE — 99215 OFFICE O/P EST HI 40 MIN: CPT

## 2019-04-29 RX ORDER — RITUXIMAB 10 MG/ML
500 INJECTION, SOLUTION INTRAVENOUS
Qty: 20 | Refills: 0 | Status: DISCONTINUED | OUTPATIENT
Start: 2018-04-30 | End: 2019-04-29

## 2019-04-30 LAB
25(OH)D3 SERPL-MCNC: 39.3 NG/ML
ALBUMIN SERPL ELPH-MCNC: 4.5 G/DL
ALP BLD-CCNC: 44 U/L
ALT SERPL-CCNC: 28 U/L
ANION GAP SERPL CALC-SCNC: 12 MMOL/L
AST SERPL-CCNC: 19 U/L
BASOPHILS # BLD AUTO: 0.02 K/UL
BASOPHILS NFR BLD AUTO: 0.3 %
BILIRUB SERPL-MCNC: 0.4 MG/DL
BUN SERPL-MCNC: 17 MG/DL
CALCIUM SERPL-MCNC: 10.1 MG/DL
CELLS.CD3-CD19+/CELLS IN BLOOD: 1 %
CHLORIDE SERPL-SCNC: 101 MMOL/L
CO2 SERPL-SCNC: 30 MMOL/L
CREAT SERPL-MCNC: 0.76 MG/DL
CRP SERPL-MCNC: 0.13 MG/DL
EOSINOPHIL # BLD AUTO: 0.01 K/UL
EOSINOPHIL NFR BLD AUTO: 0.1 %
ERYTHROCYTE [SEDIMENTATION RATE] IN BLOOD BY WESTERGREN METHOD: 3 MM/HR
GLUCOSE SERPL-MCNC: 112 MG/DL
HCT VFR BLD CALC: 38.8 %
HGB BLD-MCNC: 12.6 G/DL
IGA SER QL IEP: 118 MG/DL
IGG SER QL IEP: 747 MG/DL
IGM SER QL IEP: 36 MG/DL
IMM GRANULOCYTES NFR BLD AUTO: 0.5 %
LYMPHOCYTES # BLD AUTO: 0.69 K/UL
LYMPHOCYTES NFR BLD AUTO: 8.7 %
MAN DIFF?: NORMAL
MCHC RBC-ENTMCNC: 32.5 GM/DL
MCHC RBC-ENTMCNC: 33.4 PG
MCV RBC AUTO: 102.9 FL
MONOCYTES # BLD AUTO: 0.3 K/UL
MONOCYTES NFR BLD AUTO: 3.8 %
NEUTROPHILS # BLD AUTO: 6.85 K/UL
NEUTROPHILS NFR BLD AUTO: 86.6 %
PLATELET # BLD AUTO: 241 K/UL
POTASSIUM SERPL-SCNC: 4.5 MMOL/L
PROT SERPL-MCNC: 6.5 G/DL
RBC # BLD: 3.77 M/UL
RBC # FLD: 14.1 %
SODIUM SERPL-SCNC: 143 MMOL/L
WBC # FLD AUTO: 7.91 K/UL

## 2019-05-14 ENCOUNTER — CLINICAL ADVICE (OUTPATIENT)
Age: 74
End: 2019-05-14

## 2019-05-15 ENCOUNTER — CHART COPY (OUTPATIENT)
Age: 74
End: 2019-05-15

## 2019-06-10 ENCOUNTER — RX RENEWAL (OUTPATIENT)
Age: 74
End: 2019-06-10

## 2019-06-12 ENCOUNTER — APPOINTMENT (OUTPATIENT)
Dept: RHEUMATOLOGY | Facility: CLINIC | Age: 74
End: 2019-06-12
Payer: MEDICARE

## 2019-06-12 VITALS
BODY MASS INDEX: 23.39 KG/M2 | HEIGHT: 64 IN | TEMPERATURE: 100 F | OXYGEN SATURATION: 96 % | WEIGHT: 137 LBS | DIASTOLIC BLOOD PRESSURE: 72 MMHG | SYSTOLIC BLOOD PRESSURE: 116 MMHG | HEART RATE: 94 BPM

## 2019-06-12 PROCEDURE — 99214 OFFICE O/P EST MOD 30 MIN: CPT | Mod: 25

## 2019-06-12 PROCEDURE — 96372 THER/PROPH/DIAG INJ SC/IM: CPT

## 2019-06-12 RX ORDER — DENOSUMAB 60 MG/ML
60 INJECTION SUBCUTANEOUS
Qty: 1 | Refills: 0 | Status: COMPLETED | OUTPATIENT
Start: 2019-06-12

## 2019-06-12 RX ADMIN — DENOSUMAB 1 MG/ML: 60 INJECTION SUBCUTANEOUS at 00:00

## 2019-06-13 LAB
25(OH)D3 SERPL-MCNC: 35.4 NG/ML
ALBUMIN SERPL ELPH-MCNC: 4.1 G/DL
ALP BLD-CCNC: 56 U/L
ALT SERPL-CCNC: 36 U/L
ANION GAP SERPL CALC-SCNC: 13 MMOL/L
AST SERPL-CCNC: 35 U/L
BASOPHILS # BLD AUTO: 0.04 K/UL
BASOPHILS NFR BLD AUTO: 0.5 %
BILIRUB SERPL-MCNC: 0.5 MG/DL
BUN SERPL-MCNC: 16 MG/DL
CALCIUM SERPL-MCNC: 9 MG/DL
CELLS.CD3-CD19+/CELLS IN BLOOD: 2 %
CHLORIDE SERPL-SCNC: 101 MMOL/L
CO2 SERPL-SCNC: 28 MMOL/L
CREAT SERPL-MCNC: 0.76 MG/DL
CRP SERPL-MCNC: 12.94 MG/DL
EOSINOPHIL # BLD AUTO: 0.08 K/UL
EOSINOPHIL NFR BLD AUTO: 1 %
ERYTHROCYTE [SEDIMENTATION RATE] IN BLOOD BY WESTERGREN METHOD: 16 MM/HR
GLUCOSE SERPL-MCNC: 106 MG/DL
HCT VFR BLD CALC: 35.8 %
HGB BLD-MCNC: 11.5 G/DL
IMM GRANULOCYTES NFR BLD AUTO: 0.4 %
LYMPHOCYTES # BLD AUTO: 0.66 K/UL
LYMPHOCYTES NFR BLD AUTO: 7.9 %
MAN DIFF?: NORMAL
MCHC RBC-ENTMCNC: 32.1 GM/DL
MCHC RBC-ENTMCNC: 33.3 PG
MCV RBC AUTO: 103.8 FL
MONOCYTES # BLD AUTO: 0.74 K/UL
MONOCYTES NFR BLD AUTO: 8.8 %
NEUTROPHILS # BLD AUTO: 6.83 K/UL
NEUTROPHILS NFR BLD AUTO: 81.4 %
PLATELET # BLD AUTO: 196 K/UL
POTASSIUM SERPL-SCNC: 4 MMOL/L
PROT SERPL-MCNC: 6.5 G/DL
RBC # BLD: 3.45 M/UL
RBC # FLD: 13.1 %
SODIUM SERPL-SCNC: 141 MMOL/L
WBC # FLD AUTO: 8.38 K/UL

## 2019-06-17 ENCOUNTER — APPOINTMENT (OUTPATIENT)
Dept: RHEUMATOLOGY | Facility: CLINIC | Age: 74
End: 2019-06-17

## 2019-06-24 ENCOUNTER — APPOINTMENT (OUTPATIENT)
Dept: CT IMAGING | Facility: CLINIC | Age: 74
End: 2019-06-24
Payer: MEDICARE

## 2019-06-24 ENCOUNTER — OUTPATIENT (OUTPATIENT)
Dept: OUTPATIENT SERVICES | Facility: HOSPITAL | Age: 74
LOS: 1 days | End: 2019-06-24
Payer: MEDICARE

## 2019-06-24 DIAGNOSIS — Z00.8 ENCOUNTER FOR OTHER GENERAL EXAMINATION: ICD-10-CM

## 2019-06-24 PROCEDURE — 72131 CT LUMBAR SPINE W/O DYE: CPT

## 2019-06-24 PROCEDURE — 76376 3D RENDER W/INTRP POSTPROCES: CPT | Mod: 26

## 2019-06-24 PROCEDURE — 76376 3D RENDER W/INTRP POSTPROCES: CPT

## 2019-06-24 PROCEDURE — 72131 CT LUMBAR SPINE W/O DYE: CPT | Mod: 26

## 2019-06-26 ENCOUNTER — FORM ENCOUNTER (OUTPATIENT)
Age: 74
End: 2019-06-26

## 2019-06-27 ENCOUNTER — APPOINTMENT (OUTPATIENT)
Dept: RADIOLOGY | Facility: CLINIC | Age: 74
End: 2019-06-27
Payer: MEDICARE

## 2019-06-27 ENCOUNTER — OUTPATIENT (OUTPATIENT)
Dept: OUTPATIENT SERVICES | Facility: HOSPITAL | Age: 74
LOS: 1 days | End: 2019-06-27
Payer: MEDICARE

## 2019-06-27 DIAGNOSIS — M06.9 RHEUMATOID ARTHRITIS, UNSPECIFIED: ICD-10-CM

## 2019-06-27 PROCEDURE — 73130 X-RAY EXAM OF HAND: CPT

## 2019-06-27 PROCEDURE — 73600 X-RAY EXAM OF ANKLE: CPT

## 2019-06-27 PROCEDURE — 73130 X-RAY EXAM OF HAND: CPT | Mod: 26,50

## 2019-06-27 PROCEDURE — 73630 X-RAY EXAM OF FOOT: CPT | Mod: 26,50

## 2019-06-27 PROCEDURE — 73110 X-RAY EXAM OF WRIST: CPT

## 2019-06-27 PROCEDURE — 73600 X-RAY EXAM OF ANKLE: CPT | Mod: 26,50

## 2019-06-27 PROCEDURE — 73630 X-RAY EXAM OF FOOT: CPT

## 2019-06-27 PROCEDURE — 73110 X-RAY EXAM OF WRIST: CPT | Mod: 26,50

## 2019-06-28 ENCOUNTER — APPOINTMENT (OUTPATIENT)
Dept: NEUROSURGERY | Facility: CLINIC | Age: 74
End: 2019-06-28
Payer: MEDICARE

## 2019-06-28 DIAGNOSIS — S32.020A WEDGE COMPRESSION FRACTURE OF SECOND LUMBAR VERTEBRA, INITIAL ENCOUNTER FOR CLOSED FRACTURE: ICD-10-CM

## 2019-06-28 PROCEDURE — 99203 OFFICE O/P NEW LOW 30 MIN: CPT

## 2019-06-28 RX ORDER — CEFDINIR 300 MG/1
300 CAPSULE ORAL
Refills: 0 | Status: DISCONTINUED | COMMUNITY
Start: 2018-07-27 | End: 2019-06-28

## 2019-07-05 ENCOUNTER — RX CHANGE (OUTPATIENT)
Age: 74
End: 2019-07-05

## 2019-07-06 NOTE — HISTORY OF PRESENT ILLNESS
[FreeTextEntry1] : Back pain [de-identified] : SUSHANT SANDOVAL is a 73 year old female here  with the chief complaint of approximately 2 weeks ago, picked up a [employer] bottle box and had back pain.  She has been having back pain.  The pain is in the mid back, constant, uncomfortable.  The pain is worse at night, it is hard for her to sleep.  She was given a muscle relaxant, Chlorozoxazone 750 mg tabs and Duexis (Ibuprofen/famotidine) 800/26.6 mg  She has no B/B complaints and no pain in the legs.\par \par Dr. Jacob Grace oncologist [place] ([phone])\par / [facility]rologist [address]  ([phone])\par \par Pt is alert, awake in mild apparent discomfort.  VILLAFANA with good strength.  MIld tenderness in the upper lumbar region.  MOderate to severe paraspinal spasm.  Gait is normal, symmetrical.

## 2019-07-06 NOTE — RESULTS/DATA
[FreeTextEntry1] : CT 6/24/19:  CT lumbar spine: There is a fracture along the inferior endplate of L2 \par with mild depression. No retropulsion noted. No additional fracture is seen. \par There appears to be facet and ligamentous hypertrophy with stenosis at C34, L45, L5S1\par osteopenia

## 2019-07-06 NOTE — ASSESSMENT
[FreeTextEntry1] : Pt with back pain s/p inferior L2 osteoporotic vertebral body fracture.  Recommend brace for support and Robaxin 500-1000 mg qid prn.  Return in one  month for follow up.  Sooner if increase pain.

## 2019-07-15 ENCOUNTER — APPOINTMENT (OUTPATIENT)
Dept: NEUROSURGERY | Facility: CLINIC | Age: 74
End: 2019-07-15

## 2019-07-22 ENCOUNTER — RX RENEWAL (OUTPATIENT)
Age: 74
End: 2019-07-22

## 2019-08-10 ENCOUNTER — RX RENEWAL (OUTPATIENT)
Age: 74
End: 2019-08-10

## 2019-08-27 ENCOUNTER — APPOINTMENT (OUTPATIENT)
Dept: RADIOLOGY | Facility: CLINIC | Age: 74
End: 2019-08-27
Payer: MEDICARE

## 2019-08-27 ENCOUNTER — OUTPATIENT (OUTPATIENT)
Dept: OUTPATIENT SERVICES | Facility: HOSPITAL | Age: 74
LOS: 1 days | End: 2019-08-27
Payer: MEDICARE

## 2019-08-27 DIAGNOSIS — Z00.8 ENCOUNTER FOR OTHER GENERAL EXAMINATION: ICD-10-CM

## 2019-08-27 PROCEDURE — 72110 X-RAY EXAM L-2 SPINE 4/>VWS: CPT | Mod: 26

## 2019-08-27 PROCEDURE — 72110 X-RAY EXAM L-2 SPINE 4/>VWS: CPT

## 2019-09-12 ENCOUNTER — APPOINTMENT (OUTPATIENT)
Dept: RHEUMATOLOGY | Facility: CLINIC | Age: 74
End: 2019-09-12
Payer: MEDICARE

## 2019-09-12 VITALS
SYSTOLIC BLOOD PRESSURE: 140 MMHG | HEART RATE: 90 BPM | HEIGHT: 64 IN | OXYGEN SATURATION: 97 % | RESPIRATION RATE: 16 BRPM | WEIGHT: 135 LBS | BODY MASS INDEX: 23.05 KG/M2 | DIASTOLIC BLOOD PRESSURE: 77 MMHG

## 2019-09-12 PROCEDURE — 99214 OFFICE O/P EST MOD 30 MIN: CPT

## 2019-09-12 RX ORDER — PREDNISONE 2.5 MG/1
2.5 TABLET ORAL DAILY
Qty: 90 | Refills: 0 | Status: COMPLETED | COMMUNITY
Start: 2019-03-13 | End: 2019-09-12

## 2019-09-17 LAB
25(OH)D3 SERPL-MCNC: 42.7 NG/ML
ALBUMIN SERPL ELPH-MCNC: 4.6 G/DL
ALP BLD-CCNC: 41 U/L
ALT SERPL-CCNC: 18 U/L
ANION GAP SERPL CALC-SCNC: 15 MMOL/L
AST SERPL-CCNC: 19 U/L
BASOPHILS # BLD AUTO: 0.04 K/UL
BASOPHILS NFR BLD AUTO: 0.7 %
BILIRUB SERPL-MCNC: 0.3 MG/DL
BUN SERPL-MCNC: 14 MG/DL
CALCIUM SERPL-MCNC: 9.3 MG/DL
CHLORIDE SERPL-SCNC: 104 MMOL/L
CO2 SERPL-SCNC: 24 MMOL/L
CREAT SERPL-MCNC: 0.74 MG/DL
CRP SERPL-MCNC: <0.1 MG/DL
EOSINOPHIL # BLD AUTO: 0.06 K/UL
EOSINOPHIL NFR BLD AUTO: 1 %
ERYTHROCYTE [SEDIMENTATION RATE] IN BLOOD BY WESTERGREN METHOD: 8 MM/HR
GLUCOSE SERPL-MCNC: 100 MG/DL
HCT VFR BLD CALC: 39 %
HGB BLD-MCNC: 12.6 G/DL
IMM GRANULOCYTES NFR BLD AUTO: 0.3 %
LYMPHOCYTES # BLD AUTO: 1.15 K/UL
LYMPHOCYTES NFR BLD AUTO: 18.8 %
MAN DIFF?: NORMAL
MCHC RBC-ENTMCNC: 32.3 GM/DL
MCHC RBC-ENTMCNC: 33.1 PG
MCV RBC AUTO: 102.4 FL
MONOCYTES # BLD AUTO: 0.5 K/UL
MONOCYTES NFR BLD AUTO: 8.2 %
NEUTROPHILS # BLD AUTO: 4.34 K/UL
NEUTROPHILS NFR BLD AUTO: 71 %
PLATELET # BLD AUTO: 239 K/UL
POTASSIUM SERPL-SCNC: 4.4 MMOL/L
PROT SERPL-MCNC: 6.8 G/DL
RBC # BLD: 3.81 M/UL
RBC # FLD: 13.5 %
SODIUM SERPL-SCNC: 143 MMOL/L
WBC # FLD AUTO: 6.11 K/UL

## 2019-09-23 ENCOUNTER — APPOINTMENT (OUTPATIENT)
Dept: NEUROSURGERY | Facility: CLINIC | Age: 74
End: 2019-09-23

## 2019-10-08 ENCOUNTER — RX RENEWAL (OUTPATIENT)
Age: 74
End: 2019-10-08

## 2019-11-01 ENCOUNTER — TRANSCRIPTION ENCOUNTER (OUTPATIENT)
Age: 74
End: 2019-11-01

## 2019-11-29 ENCOUNTER — RX RENEWAL (OUTPATIENT)
Age: 74
End: 2019-11-29

## 2019-12-02 ENCOUNTER — CHART COPY (OUTPATIENT)
Age: 74
End: 2019-12-02

## 2019-12-10 ENCOUNTER — LABORATORY RESULT (OUTPATIENT)
Age: 74
End: 2019-12-10

## 2019-12-10 ENCOUNTER — APPOINTMENT (OUTPATIENT)
Dept: RHEUMATOLOGY | Facility: CLINIC | Age: 74
End: 2019-12-10
Payer: MEDICARE

## 2019-12-10 VITALS
WEIGHT: 134 LBS | HEIGHT: 64 IN | OXYGEN SATURATION: 98 % | SYSTOLIC BLOOD PRESSURE: 130 MMHG | BODY MASS INDEX: 22.88 KG/M2 | HEART RATE: 86 BPM | DIASTOLIC BLOOD PRESSURE: 80 MMHG | RESPIRATION RATE: 16 BRPM | TEMPERATURE: 98.7 F

## 2019-12-10 PROCEDURE — 99214 OFFICE O/P EST MOD 30 MIN: CPT | Mod: 25

## 2019-12-10 PROCEDURE — 96372 THER/PROPH/DIAG INJ SC/IM: CPT

## 2019-12-10 RX ORDER — COLCHICINE 0.6 MG/1
0.6 TABLET ORAL
Qty: 90 | Refills: 0 | Status: DISCONTINUED | COMMUNITY
Start: 2019-09-12 | End: 2019-12-10

## 2019-12-10 RX ORDER — DENOSUMAB 60 MG/ML
60 INJECTION SUBCUTANEOUS
Qty: 1 | Refills: 0 | Status: COMPLETED | OUTPATIENT
Start: 2019-12-10

## 2019-12-10 RX ADMIN — DENOSUMAB 1 MG/ML: 60 INJECTION SUBCUTANEOUS at 00:00

## 2019-12-11 LAB
25(OH)D3 SERPL-MCNC: 39.8 NG/ML
ALBUMIN SERPL ELPH-MCNC: 4.6 G/DL
ALP BLD-CCNC: 35 U/L
ALT SERPL-CCNC: 29 U/L
ANION GAP SERPL CALC-SCNC: 14 MMOL/L
AST SERPL-CCNC: 23 U/L
BASOPHILS # BLD AUTO: 0.04 K/UL
BASOPHILS NFR BLD AUTO: 0.9 %
BILIRUB SERPL-MCNC: 0.3 MG/DL
BUN SERPL-MCNC: 16 MG/DL
CALCIUM SERPL-MCNC: 9.4 MG/DL
CELLS.CD3-CD19+/CELLS IN BLOOD: 3 %
CHLORIDE SERPL-SCNC: 104 MMOL/L
CO2 SERPL-SCNC: 26 MMOL/L
CREAT SERPL-MCNC: 0.74 MG/DL
CRP SERPL-MCNC: 0.21 MG/DL
EOSINOPHIL # BLD AUTO: 0.04 K/UL
EOSINOPHIL NFR BLD AUTO: 0.9 %
ERYTHROCYTE [SEDIMENTATION RATE] IN BLOOD BY WESTERGREN METHOD: 6 MM/HR
GLUCOSE SERPL-MCNC: 83 MG/DL
HAV IGM SER QL: NONREACTIVE
HBV CORE IGG+IGM SER QL: NONREACTIVE
HBV CORE IGM SER QL: NONREACTIVE
HBV SURFACE AG SER QL: NONREACTIVE
HCT VFR BLD CALC: 39.3 %
HCV AB SER QL: NONREACTIVE
HCV S/CO RATIO: 0.1 S/CO
HGB BLD-MCNC: 12.2 G/DL
IGA SER QL IEP: 151 MG/DL
IGG SER QL IEP: 783 MG/DL
IGM SER QL IEP: 41 MG/DL
IMM GRANULOCYTES NFR BLD AUTO: 0.2 %
LYMPHOCYTES # BLD AUTO: 1.12 K/UL
LYMPHOCYTES NFR BLD AUTO: 25.6 %
MAN DIFF?: NORMAL
MCHC RBC-ENTMCNC: 31 GM/DL
MCHC RBC-ENTMCNC: 33 PG
MCV RBC AUTO: 106.2 FL
MONOCYTES # BLD AUTO: 0.38 K/UL
MONOCYTES NFR BLD AUTO: 8.7 %
NEUTROPHILS # BLD AUTO: 2.78 K/UL
NEUTROPHILS NFR BLD AUTO: 63.7 %
PLATELET # BLD AUTO: 225 K/UL
POTASSIUM SERPL-SCNC: 4.4 MMOL/L
PROT SERPL-MCNC: 6.5 G/DL
RBC # BLD: 3.7 M/UL
RBC # FLD: 13.9 %
SODIUM SERPL-SCNC: 144 MMOL/L
WBC # FLD AUTO: 4.37 K/UL

## 2019-12-12 LAB
M TB IFN-G BLD-IMP: NEGATIVE
QUANTIFERON TB PLUS MITOGEN MINUS NIL: 8.9 IU/ML
QUANTIFERON TB PLUS NIL: 0.01 IU/ML
QUANTIFERON TB PLUS TB1 MINUS NIL: 0.02 IU/ML
QUANTIFERON TB PLUS TB2 MINUS NIL: 0 IU/ML

## 2019-12-16 ENCOUNTER — FORM ENCOUNTER (OUTPATIENT)
Age: 74
End: 2019-12-16

## 2019-12-17 ENCOUNTER — APPOINTMENT (OUTPATIENT)
Dept: RADIOLOGY | Facility: CLINIC | Age: 74
End: 2019-12-17
Payer: MEDICARE

## 2019-12-17 ENCOUNTER — OUTPATIENT (OUTPATIENT)
Dept: OUTPATIENT SERVICES | Facility: HOSPITAL | Age: 74
LOS: 1 days | End: 2019-12-17
Payer: MEDICARE

## 2019-12-17 DIAGNOSIS — M54.2 CERVICALGIA: ICD-10-CM

## 2019-12-17 PROCEDURE — 72050 X-RAY EXAM NECK SPINE 4/5VWS: CPT

## 2019-12-17 PROCEDURE — 72050 X-RAY EXAM NECK SPINE 4/5VWS: CPT | Mod: 26

## 2019-12-30 ENCOUNTER — RX RENEWAL (OUTPATIENT)
Age: 74
End: 2019-12-30

## 2020-01-02 ENCOUNTER — APPOINTMENT (OUTPATIENT)
Dept: RHEUMATOLOGY | Facility: CLINIC | Age: 75
End: 2020-01-02
Payer: MEDICARE

## 2020-01-02 VITALS
HEIGHT: 64 IN | TEMPERATURE: 98.6 F | RESPIRATION RATE: 16 BRPM | WEIGHT: 133 LBS | HEART RATE: 82 BPM | BODY MASS INDEX: 22.71 KG/M2 | DIASTOLIC BLOOD PRESSURE: 78 MMHG | SYSTOLIC BLOOD PRESSURE: 127 MMHG | OXYGEN SATURATION: 98 %

## 2020-01-02 DIAGNOSIS — M54.2 CERVICALGIA: ICD-10-CM

## 2020-01-02 PROCEDURE — 99214 OFFICE O/P EST MOD 30 MIN: CPT

## 2020-01-02 RX ORDER — PREDNISONE 2.5 MG/1
2.5 TABLET ORAL
Qty: 40 | Refills: 0 | Status: DISCONTINUED | OUTPATIENT
Start: 2019-12-10 | End: 2020-01-02

## 2020-01-02 RX ORDER — BACLOFEN 5 MG/1
5 TABLET ORAL 3 TIMES DAILY
Qty: 90 | Refills: 0 | Status: DISCONTINUED | COMMUNITY
Start: 2019-07-05 | End: 2020-01-02

## 2020-01-03 LAB
ALBUMIN SERPL ELPH-MCNC: 4.5 G/DL
ALP BLD-CCNC: 37 U/L
ALT SERPL-CCNC: 15 U/L
ANION GAP SERPL CALC-SCNC: 13 MMOL/L
AST SERPL-CCNC: 19 U/L
BASOPHILS # BLD AUTO: 0.04 K/UL
BASOPHILS NFR BLD AUTO: 0.7 %
BILIRUB SERPL-MCNC: 0.3 MG/DL
BUN SERPL-MCNC: 15 MG/DL
CALCIUM SERPL-MCNC: 9 MG/DL
CHLORIDE SERPL-SCNC: 104 MMOL/L
CO2 SERPL-SCNC: 26 MMOL/L
CREAT SERPL-MCNC: 0.74 MG/DL
CRP SERPL-MCNC: 0.17 MG/DL
EOSINOPHIL # BLD AUTO: 0.05 K/UL
EOSINOPHIL NFR BLD AUTO: 0.9 %
ERYTHROCYTE [SEDIMENTATION RATE] IN BLOOD BY WESTERGREN METHOD: 8 MM/HR
GLUCOSE SERPL-MCNC: 96 MG/DL
HCT VFR BLD CALC: 39.8 %
HGB BLD-MCNC: 12.5 G/DL
IMM GRANULOCYTES NFR BLD AUTO: 0.2 %
LYMPHOCYTES # BLD AUTO: 1.27 K/UL
LYMPHOCYTES NFR BLD AUTO: 22.2 %
MAN DIFF?: NORMAL
MCHC RBC-ENTMCNC: 31.4 GM/DL
MCHC RBC-ENTMCNC: 33.3 PG
MCV RBC AUTO: 106.1 FL
MONOCYTES # BLD AUTO: 0.4 K/UL
MONOCYTES NFR BLD AUTO: 7 %
NEUTROPHILS # BLD AUTO: 3.95 K/UL
NEUTROPHILS NFR BLD AUTO: 69 %
PLATELET # BLD AUTO: 204 K/UL
POTASSIUM SERPL-SCNC: 4.4 MMOL/L
PROT SERPL-MCNC: 6.6 G/DL
RBC # BLD: 3.75 M/UL
RBC # FLD: 14 %
SODIUM SERPL-SCNC: 143 MMOL/L
WBC # FLD AUTO: 5.72 K/UL

## 2020-01-14 DIAGNOSIS — J45.909 UNSPECIFIED ASTHMA, UNCOMPLICATED: ICD-10-CM

## 2020-01-15 LAB
ALBUMIN SERPL ELPH-MCNC: 4.6 G/DL
ALP BLD-CCNC: 34 U/L
ALT SERPL-CCNC: 17 U/L
ANION GAP SERPL CALC-SCNC: 14 MMOL/L
AST SERPL-CCNC: 20 U/L
BASOPHILS # BLD AUTO: 0.03 K/UL
BASOPHILS NFR BLD AUTO: 0.6 %
BILIRUB SERPL-MCNC: 0.4 MG/DL
BUN SERPL-MCNC: 20 MG/DL
CALCIUM SERPL-MCNC: 9.6 MG/DL
CHLORIDE SERPL-SCNC: 101 MMOL/L
CO2 SERPL-SCNC: 29 MMOL/L
CREAT SERPL-MCNC: 0.78 MG/DL
EOSINOPHIL # BLD AUTO: 0.04 K/UL
EOSINOPHIL NFR BLD AUTO: 0.7 %
GLUCOSE SERPL-MCNC: 110 MG/DL
HCT VFR BLD CALC: 37.4 %
HGB BLD-MCNC: 11.9 G/DL
IMM GRANULOCYTES NFR BLD AUTO: 0.4 %
LYMPHOCYTES # BLD AUTO: 1.32 K/UL
LYMPHOCYTES NFR BLD AUTO: 24.2 %
MAN DIFF?: NORMAL
MCHC RBC-ENTMCNC: 31.8 GM/DL
MCHC RBC-ENTMCNC: 33.3 PG
MCV RBC AUTO: 104.8 FL
MONOCYTES # BLD AUTO: 0.3 K/UL
MONOCYTES NFR BLD AUTO: 5.5 %
NEUTROPHILS # BLD AUTO: 3.74 K/UL
NEUTROPHILS NFR BLD AUTO: 68.6 %
PLATELET # BLD AUTO: 255 K/UL
POTASSIUM SERPL-SCNC: 4.2 MMOL/L
PROT SERPL-MCNC: 6.3 G/DL
RBC # BLD: 3.57 M/UL
RBC # FLD: 13.7 %
SODIUM SERPL-SCNC: 144 MMOL/L
WBC # FLD AUTO: 5.45 K/UL

## 2020-01-17 ENCOUNTER — TRANSCRIPTION ENCOUNTER (OUTPATIENT)
Age: 75
End: 2020-01-17

## 2020-01-31 ENCOUNTER — OUTPATIENT (OUTPATIENT)
Dept: OUTPATIENT SERVICES | Facility: HOSPITAL | Age: 75
LOS: 1 days | End: 2020-01-31
Payer: MEDICARE

## 2020-01-31 ENCOUNTER — APPOINTMENT (OUTPATIENT)
Dept: MRI IMAGING | Facility: CLINIC | Age: 75
End: 2020-01-31
Payer: MEDICARE

## 2020-01-31 DIAGNOSIS — Z00.8 ENCOUNTER FOR OTHER GENERAL EXAMINATION: ICD-10-CM

## 2020-01-31 PROCEDURE — 73721 MRI JNT OF LWR EXTRE W/O DYE: CPT | Mod: 26,RT

## 2020-01-31 PROCEDURE — 73721 MRI JNT OF LWR EXTRE W/O DYE: CPT

## 2020-02-11 ENCOUNTER — APPOINTMENT (OUTPATIENT)
Dept: RHEUMATOLOGY | Facility: CLINIC | Age: 75
End: 2020-02-11
Payer: MEDICARE

## 2020-02-11 VITALS
WEIGHT: 133 LBS | HEART RATE: 84 BPM | RESPIRATION RATE: 16 BRPM | OXYGEN SATURATION: 98 % | BODY MASS INDEX: 22.71 KG/M2 | SYSTOLIC BLOOD PRESSURE: 126 MMHG | HEIGHT: 64 IN | TEMPERATURE: 98.7 F | DIASTOLIC BLOOD PRESSURE: 82 MMHG

## 2020-02-11 DIAGNOSIS — M25.561 PAIN IN RIGHT KNEE: ICD-10-CM

## 2020-02-11 PROCEDURE — 99214 OFFICE O/P EST MOD 30 MIN: CPT | Mod: 25

## 2020-02-11 PROCEDURE — 20610 DRAIN/INJ JOINT/BURSA W/O US: CPT | Mod: RT

## 2020-02-11 RX ORDER — METHOCARBAMOL 500 MG/1
500 TABLET, FILM COATED ORAL
Qty: 60 | Refills: 3 | Status: DISCONTINUED | COMMUNITY
Start: 2019-06-28 | End: 2020-02-11

## 2020-02-11 RX ORDER — LIDOCAINE HYDROCHLORIDE 10 MG/ML
1 INJECTION, SOLUTION INFILTRATION; PERINEURAL
Refills: 0 | Status: COMPLETED | OUTPATIENT
Start: 2020-02-11

## 2020-02-11 RX ORDER — METHYLPRED ACET/NACL,ISO-OS/PF 40 MG/ML
40 VIAL (ML) INJECTION
Qty: 1 | Refills: 0 | Status: COMPLETED | OUTPATIENT
Start: 2020-02-11

## 2020-02-11 RX ADMIN — METHYLPREDNISOLONE ACETATE 1 MG/ML: 40 INJECTION, SUSPENSION INTRA-ARTICULAR; INTRALESIONAL; INTRAMUSCULAR; SOFT TISSUE at 00:00

## 2020-02-11 RX ADMIN — LIDOCAINE HYDROCHLORIDE %: 10 INJECTION, SOLUTION INFILTRATION; PERINEURAL at 00:00

## 2020-02-12 LAB — 25(OH)D3 SERPL-MCNC: 41.1 NG/ML

## 2020-05-07 ENCOUNTER — RX RENEWAL (OUTPATIENT)
Age: 75
End: 2020-05-07

## 2020-06-11 ENCOUNTER — TRANSCRIPTION ENCOUNTER (OUTPATIENT)
Age: 75
End: 2020-06-11

## 2020-06-12 ENCOUNTER — APPOINTMENT (OUTPATIENT)
Dept: RHEUMATOLOGY | Facility: CLINIC | Age: 75
End: 2020-06-12
Payer: MEDICARE

## 2020-06-12 VITALS
OXYGEN SATURATION: 98 % | DIASTOLIC BLOOD PRESSURE: 79 MMHG | TEMPERATURE: 97.4 F | RESPIRATION RATE: 16 BRPM | SYSTOLIC BLOOD PRESSURE: 131 MMHG | WEIGHT: 135 LBS | HEART RATE: 79 BPM | BODY MASS INDEX: 23.05 KG/M2 | HEIGHT: 64 IN

## 2020-06-12 PROCEDURE — 99214 OFFICE O/P EST MOD 30 MIN: CPT | Mod: CS,25

## 2020-06-12 PROCEDURE — 96372 THER/PROPH/DIAG INJ SC/IM: CPT

## 2020-06-12 RX ORDER — DENOSUMAB 60 MG/ML
60 INJECTION SUBCUTANEOUS
Qty: 1 | Refills: 0 | Status: COMPLETED | OUTPATIENT
Start: 2020-06-12

## 2020-06-12 RX ADMIN — DENOSUMAB 1 MG/ML: 60 INJECTION SUBCUTANEOUS at 00:00

## 2020-06-13 ENCOUNTER — NON-APPOINTMENT (OUTPATIENT)
Age: 75
End: 2020-06-13

## 2020-06-14 LAB
25(OH)D3 SERPL-MCNC: 38.1 NG/ML
ALBUMIN SERPL ELPH-MCNC: 4.9 G/DL
ALP BLD-CCNC: 36 U/L
ALT SERPL-CCNC: 21 U/L
ANION GAP SERPL CALC-SCNC: 15 MMOL/L
AST SERPL-CCNC: 25 U/L
BASOPHILS # BLD AUTO: 0.03 K/UL
BASOPHILS NFR BLD AUTO: 0.7 %
BILIRUB SERPL-MCNC: 0.4 MG/DL
BUN SERPL-MCNC: 13 MG/DL
CALCIUM SERPL-MCNC: 9.7 MG/DL
CHLORIDE SERPL-SCNC: 104 MMOL/L
CO2 SERPL-SCNC: 25 MMOL/L
CREAT SERPL-MCNC: 0.85 MG/DL
CRP SERPL-MCNC: <0.1 MG/DL
EOSINOPHIL # BLD AUTO: 0.03 K/UL
EOSINOPHIL NFR BLD AUTO: 0.7 %
ERYTHROCYTE [SEDIMENTATION RATE] IN BLOOD BY WESTERGREN METHOD: 10 MM/HR
GLUCOSE SERPL-MCNC: 89 MG/DL
HCT VFR BLD CALC: 40.4 %
HGB BLD-MCNC: 12.1 G/DL
IMM GRANULOCYTES NFR BLD AUTO: 0.2 %
LYMPHOCYTES # BLD AUTO: 0.9 K/UL
LYMPHOCYTES NFR BLD AUTO: 20.8 %
MAN DIFF?: NORMAL
MCHC RBC-ENTMCNC: 30 GM/DL
MCHC RBC-ENTMCNC: 32.5 PG
MCV RBC AUTO: 108.6 FL
MONOCYTES # BLD AUTO: 0.45 K/UL
MONOCYTES NFR BLD AUTO: 10.4 %
NEUTROPHILS # BLD AUTO: 2.9 K/UL
NEUTROPHILS NFR BLD AUTO: 67.2 %
PLATELET # BLD AUTO: 249 K/UL
POTASSIUM SERPL-SCNC: 4.4 MMOL/L
PROT SERPL-MCNC: 6.8 G/DL
RBC # BLD: 3.72 M/UL
RBC # FLD: 14.6 %
SARS-COV-2 IGG SERPL IA-ACNC: <0.1 INDEX
SARS-COV-2 IGG SERPL QL IA: NEGATIVE
SODIUM SERPL-SCNC: 144 MMOL/L
WBC # FLD AUTO: 4.32 K/UL

## 2020-06-21 ENCOUNTER — RX RENEWAL (OUTPATIENT)
Age: 75
End: 2020-06-21

## 2020-08-12 ENCOUNTER — RX RENEWAL (OUTPATIENT)
Age: 75
End: 2020-08-12

## 2020-08-17 ENCOUNTER — RX RENEWAL (OUTPATIENT)
Age: 75
End: 2020-08-17

## 2020-08-18 ENCOUNTER — TRANSCRIPTION ENCOUNTER (OUTPATIENT)
Age: 75
End: 2020-08-18

## 2020-09-14 ENCOUNTER — APPOINTMENT (OUTPATIENT)
Dept: RHEUMATOLOGY | Facility: CLINIC | Age: 75
End: 2020-09-14
Payer: MEDICARE

## 2020-09-14 ENCOUNTER — OUTPATIENT (OUTPATIENT)
Dept: OUTPATIENT SERVICES | Facility: HOSPITAL | Age: 75
LOS: 1 days | End: 2020-09-14
Payer: MEDICARE

## 2020-09-14 VITALS
HEART RATE: 64 BPM | SYSTOLIC BLOOD PRESSURE: 128 MMHG | DIASTOLIC BLOOD PRESSURE: 84 MMHG | WEIGHT: 130.07 LBS | TEMPERATURE: 97 F | OXYGEN SATURATION: 98 % | HEIGHT: 63 IN | RESPIRATION RATE: 16 BRPM

## 2020-09-14 VITALS
HEIGHT: 64 IN | DIASTOLIC BLOOD PRESSURE: 71 MMHG | BODY MASS INDEX: 23.73 KG/M2 | WEIGHT: 139 LBS | SYSTOLIC BLOOD PRESSURE: 120 MMHG | TEMPERATURE: 95.5 F | OXYGEN SATURATION: 98 % | HEART RATE: 77 BPM

## 2020-09-14 DIAGNOSIS — M15.1 HEBERDEN'S NODES (WITH ARTHROPATHY): ICD-10-CM

## 2020-09-14 DIAGNOSIS — E03.9 HYPOTHYROIDISM, UNSPECIFIED: ICD-10-CM

## 2020-09-14 DIAGNOSIS — M65.312 TRIGGER THUMB, LEFT THUMB: ICD-10-CM

## 2020-09-14 LAB
ANION GAP SERPL CALC-SCNC: 9 MMO/L — SIGNIFICANT CHANGE UP (ref 7–14)
BUN SERPL-MCNC: 19 MG/DL — SIGNIFICANT CHANGE UP (ref 7–23)
CALCIUM SERPL-MCNC: 9.4 MG/DL — SIGNIFICANT CHANGE UP (ref 8.4–10.5)
CHLORIDE SERPL-SCNC: 101 MMOL/L — SIGNIFICANT CHANGE UP (ref 98–107)
CO2 SERPL-SCNC: 28 MMOL/L — SIGNIFICANT CHANGE UP (ref 22–31)
CREAT SERPL-MCNC: 0.75 MG/DL — SIGNIFICANT CHANGE UP (ref 0.5–1.3)
GLUCOSE SERPL-MCNC: 117 MG/DL — HIGH (ref 70–99)
HCT VFR BLD CALC: 36.8 % — SIGNIFICANT CHANGE UP (ref 34.5–45)
HGB BLD-MCNC: 11.7 G/DL — SIGNIFICANT CHANGE UP (ref 11.5–15.5)
MCHC RBC-ENTMCNC: 31.8 % — LOW (ref 32–36)
MCHC RBC-ENTMCNC: 31.8 PG — SIGNIFICANT CHANGE UP (ref 27–34)
MCV RBC AUTO: 100 FL — SIGNIFICANT CHANGE UP (ref 80–100)
NRBC # FLD: 0 K/UL — SIGNIFICANT CHANGE UP (ref 0–0)
PLATELET # BLD AUTO: 249 K/UL — SIGNIFICANT CHANGE UP (ref 150–400)
PMV BLD: 10.1 FL — SIGNIFICANT CHANGE UP (ref 7–13)
POTASSIUM SERPL-MCNC: 4.5 MMOL/L — SIGNIFICANT CHANGE UP (ref 3.5–5.3)
POTASSIUM SERPL-SCNC: 4.5 MMOL/L — SIGNIFICANT CHANGE UP (ref 3.5–5.3)
RBC # BLD: 3.68 M/UL — LOW (ref 3.8–5.2)
RBC # FLD: 13.7 % — SIGNIFICANT CHANGE UP (ref 10.3–14.5)
SODIUM SERPL-SCNC: 138 MMOL/L — SIGNIFICANT CHANGE UP (ref 135–145)
WBC # BLD: 3.47 K/UL — LOW (ref 3.8–10.5)
WBC # FLD AUTO: 3.47 K/UL — LOW (ref 3.8–10.5)

## 2020-09-14 PROCEDURE — 93010 ELECTROCARDIOGRAM REPORT: CPT

## 2020-09-14 PROCEDURE — 99214 OFFICE O/P EST MOD 30 MIN: CPT

## 2020-09-14 RX ORDER — FOLIC ACID 0.8 MG
1 TABLET ORAL
Qty: 0 | Refills: 0 | DISCHARGE

## 2020-09-14 RX ORDER — LEVOTHYROXINE SODIUM 125 MCG
1 TABLET ORAL
Qty: 0 | Refills: 0 | DISCHARGE

## 2020-09-14 RX ORDER — ZOLPIDEM TARTRATE 10 MG/1
1 TABLET ORAL
Qty: 0 | Refills: 0 | DISCHARGE

## 2020-09-14 NOTE — H&P PST ADULT - NSICDXPASTMEDICALHX_GEN_ALL_CORE_FT
PAST MEDICAL HISTORY:  CLL (Chronic Lymphocytic Leukemia) (ICD9 204.10) diagnosed 13 yrs ago- no tx.    Colon Perforation (ICD9 569.83)     GERD (Gastroesophageal Reflux Disease) (ICD9 530.81)     H/O: Hypothyroidism (ICD9 V12.2)     H/O: Osteoarthritis (ICD9 V13.4)     Insomnia (ICD9 780.52)     Recurrent HSV (Herpes Simplex Virus) (ICD9 054.9)     Rheumatoid arthritis     Seasonal Allergies (ICD9 477.9)     Tenosynovitis (ICD9 727.00)     Trigger thumb, left thumb      PAST MEDICAL HISTORY:  CLL (Chronic Lymphocytic Leukemia) (ICD9 204.10) diagnosed 13 yrs ago- no tx.    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

## 2020-09-14 NOTE — H&P PST ADULT - NSICDXPASTSURGICALHX_GEN_ALL_CORE_FT
PAST SURGICAL HISTORY:  C Section (ICD9 669.70) 1976 and 1980    Melanoma (ICD9 172.9) left arm    S/P Appendectomy (ICD9 V45.89) in childhood    S/P Colectomy (ICD9 V45.89)     S/P Colonoscopy (ICD9 V45.89)     S/P Dequarvinas right wrist    S/P Tonsillectomy (ICD9 V45.89)      PAST SURGICAL HISTORY:  C Section (ICD9 669.70) 1976 and 1980    Melanoma (ICD9 172.9) left arm    S/P Appendectomy (ICD9 V45.89) in childhood    S/P cataract extraction     S/P Colectomy (ICD9 V45.89)     S/P Colonoscopy (ICD9 V45.89)     S/P Dequarvinas right wrist    S/P Tonsillectomy (ICD9 V45.89)

## 2020-09-14 NOTE — H&P PST ADULT - NEGATIVE NEUROLOGICAL SYMPTOMS
no vertigo/no transient paralysis/no weakness/no generalized seizures/no difficulty walking/no paresthesias/no syncope

## 2020-09-14 NOTE — H&P PST ADULT - NSICDXPROBLEM_GEN_ALL_CORE_FT
PROBLEM DIAGNOSES  Problem: Trigger thumb, left thumb  Assessment and Plan: preop for left thumb trigger release on 9/21/20  preop instructions given, pt verbalized understanding   pt can take prescribed famotidine AM of surgery for GI prophylaxis chlorhexidine wash provided   pt scheduled for COVID testing on 9/18/20  case discussed with Dr. Rivera- pt does not need to have medical eval preop  OR booking notified of Sulfa allergy     Problem: Hypothyroidism  Assessment and Plan: pt will take levothyroxine AM of surgery

## 2020-09-14 NOTE — H&P PST ADULT - ENDOCRINE COMMENTS
hypothyroidism- stable on current dose of levothyroxine. pt states thyroid function recently checked and WNL

## 2020-09-14 NOTE — H&P PST ADULT - HISTORY OF PRESENT ILLNESS
74 y/o female with Hypothyroidism, Rheumatoid Arthritis and CLL( in remission) presents to PST preop for left thumb trigger release on 9/21/2020. preop dx of Heberden's nodes (with arthropathy), trigger thumb, left thumb.   pt reports pain and stiffness to left thumb for 1.5 years. she states despite multiple cortisone injections, her left thumb remains stuck. now for surgery.

## 2020-09-17 DIAGNOSIS — Z01.818 ENCOUNTER FOR OTHER PREPROCEDURAL EXAMINATION: ICD-10-CM

## 2020-09-18 ENCOUNTER — APPOINTMENT (OUTPATIENT)
Dept: DISASTER EMERGENCY | Facility: CLINIC | Age: 75
End: 2020-09-18

## 2020-09-18 VITALS
RESPIRATION RATE: 18 BRPM | OXYGEN SATURATION: 100 % | DIASTOLIC BLOOD PRESSURE: 79 MMHG | TEMPERATURE: 98 F | HEART RATE: 77 BPM | SYSTOLIC BLOOD PRESSURE: 136 MMHG | HEIGHT: 63 IN | WEIGHT: 130.07 LBS

## 2020-09-19 LAB — SARS-COV-2 N GENE NPH QL NAA+PROBE: NOT DETECTED

## 2020-09-20 ENCOUNTER — TRANSCRIPTION ENCOUNTER (OUTPATIENT)
Age: 75
End: 2020-09-20

## 2020-09-21 ENCOUNTER — OUTPATIENT (OUTPATIENT)
Dept: OUTPATIENT SERVICES | Facility: HOSPITAL | Age: 75
LOS: 1 days | Discharge: HOME | End: 2020-09-21

## 2020-09-21 VITALS
DIASTOLIC BLOOD PRESSURE: 75 MMHG | HEART RATE: 67 BPM | TEMPERATURE: 98 F | SYSTOLIC BLOOD PRESSURE: 120 MMHG | OXYGEN SATURATION: 99 % | RESPIRATION RATE: 21 BRPM

## 2020-09-21 DIAGNOSIS — M15.1 HEBERDEN'S NODES (WITH ARTHROPATHY): ICD-10-CM

## 2020-09-21 DIAGNOSIS — Z98.49 CATARACT EXTRACTION STATUS, UNSPECIFIED EYE: Chronic | ICD-10-CM

## 2020-09-21 RX ORDER — OMEPRAZOLE 10 MG/1
1 CAPSULE, DELAYED RELEASE ORAL
Qty: 0 | Refills: 0 | DISCHARGE

## 2020-09-21 RX ORDER — LEVOTHYROXINE SODIUM 125 MCG
1 TABLET ORAL
Qty: 0 | Refills: 0 | DISCHARGE

## 2020-09-21 RX ORDER — FOLIC ACID 0.8 MG
2 TABLET ORAL
Qty: 0 | Refills: 0 | DISCHARGE

## 2020-09-21 RX ORDER — HYDROXYCHLOROQUINE SULFATE 200 MG
1 TABLET ORAL
Qty: 0 | Refills: 0 | DISCHARGE

## 2020-09-21 RX ORDER — SODIUM CHLORIDE 9 MG/ML
3 INJECTION INTRAMUSCULAR; INTRAVENOUS; SUBCUTANEOUS ONCE
Refills: 0 | Status: DISCONTINUED | OUTPATIENT
Start: 2020-09-21 | End: 2020-10-05

## 2020-09-21 RX ORDER — ZOLPIDEM TARTRATE 10 MG/1
1 TABLET ORAL
Qty: 0 | Refills: 0 | DISCHARGE

## 2020-09-21 RX ORDER — TOFACITINIB 11 MG/1
1 TABLET, FILM COATED, EXTENDED RELEASE ORAL
Qty: 0 | Refills: 0 | DISCHARGE

## 2020-09-21 RX ORDER — METHOTREXATE 2.5 MG/1
0.8 TABLET ORAL
Qty: 0 | Refills: 0 | DISCHARGE

## 2020-09-21 RX ORDER — VALACYCLOVIR 500 MG/1
1 TABLET, FILM COATED ORAL
Qty: 0 | Refills: 0 | DISCHARGE

## 2020-09-21 RX ORDER — FAMOTIDINE 10 MG/ML
1 INJECTION INTRAVENOUS
Qty: 0 | Refills: 0 | DISCHARGE

## 2020-09-21 NOTE — ASU DISCHARGE PLAN (ADULT/PEDIATRIC) - ACTIVITY LEVEL
No excercise/Elevate extremity/No tub baths/No heavy lifting/No sports/gym/Weight bearing as tolerated

## 2020-09-21 NOTE — ASU DISCHARGE PLAN (ADULT/PEDIATRIC) - CARE PROVIDER_API CALL
Brady Parker)  Orthopaedic Surgery; Surgery of the Hand  [address], [address]  [place] [address]  Phone: [phone]  Fax: [phone]  Follow Up Time:

## 2020-09-21 NOTE — ASU DISCHARGE PLAN (ADULT/PEDIATRIC) - CALL YOUR DOCTOR IF YOU HAVE ANY OF THE FOLLOWING:
Wound/Surgical Site with redness, or foul smelling discharge or pus/Pain not relieved by Medications/Fever greater than (need to indicate Fahrenheit or Celsius)/Swelling that gets worse/Bleeding that does not stop/Numbness, tingling, color or temperature change to extremity

## 2020-10-17 ENCOUNTER — TRANSCRIPTION ENCOUNTER (OUTPATIENT)
Age: 75
End: 2020-10-17

## 2020-11-03 ENCOUNTER — APPOINTMENT (OUTPATIENT)
Dept: RHEUMATOLOGY | Facility: CLINIC | Age: 75
End: 2020-11-03
Payer: MEDICARE

## 2020-11-03 VITALS
OXYGEN SATURATION: 98 % | TEMPERATURE: 96.1 F | HEIGHT: 64 IN | DIASTOLIC BLOOD PRESSURE: 83 MMHG | SYSTOLIC BLOOD PRESSURE: 124 MMHG | RESPIRATION RATE: 16 BRPM | HEART RATE: 79 BPM | BODY MASS INDEX: 23.9 KG/M2 | WEIGHT: 140 LBS

## 2020-11-03 DIAGNOSIS — E03.9 HYPOTHYROIDISM, UNSPECIFIED: ICD-10-CM

## 2020-11-03 PROBLEM — M65.312 TRIGGER THUMB, LEFT THUMB: Chronic | Status: ACTIVE | Noted: 2020-09-14

## 2020-11-03 PROBLEM — M06.9 RHEUMATOID ARTHRITIS, UNSPECIFIED: Chronic | Status: ACTIVE | Noted: 2020-09-14

## 2020-11-03 PROBLEM — M15.1 HEBERDEN'S NODES (WITH ARTHROPATHY): Chronic | Status: ACTIVE | Noted: 2020-09-14

## 2020-11-03 PROCEDURE — 99215 OFFICE O/P EST HI 40 MIN: CPT

## 2020-11-03 RX ORDER — ALBUTEROL SULFATE 90 UG/1
108 (90 BASE) AEROSOL, METERED RESPIRATORY (INHALATION)
Qty: 18 | Refills: 0 | Status: ACTIVE | COMMUNITY
Start: 2020-10-19

## 2020-11-09 LAB
25(OH)D3 SERPL-MCNC: 37.4 NG/ML
ALBUMIN SERPL ELPH-MCNC: 4.5 G/DL
ALP BLD-CCNC: 47 U/L
ALT SERPL-CCNC: 20 U/L
AMYLASE P SERPL-CCNC: 33 U/L
AMYLASE S SERPL-CCNC: 16 U/L
AMYLASE SERPL-CCNC: 49 U/L
AMYLASE/CREAT SERPL: 64 U/L
ANION GAP SERPL CALC-SCNC: 13 MMOL/L
AST SERPL-CCNC: 19 U/L
BASOPHILS # BLD AUTO: 0.05 K/UL
BASOPHILS NFR BLD AUTO: 1.3 %
BILIRUB SERPL-MCNC: 0.4 MG/DL
BUN SERPL-MCNC: 20 MG/DL
CALCIUM SERPL-MCNC: 9 MG/DL
CANCER AG125 SERPL-ACNC: 22 U/ML
CANCER AG19-9 SERPL-ACNC: 31 U/ML
CANCER AG27-29 SERPL-ACNC: 30.8 U/ML
CELLS.CD3-CD19+/CELLS IN BLOOD: 3 %
CHLORIDE SERPL-SCNC: 102 MMOL/L
CO2 SERPL-SCNC: 27 MMOL/L
CREAT SERPL-MCNC: 0.79 MG/DL
CRP SERPL-MCNC: 0.15 MG/DL
EOSINOPHIL # BLD AUTO: 0.04 K/UL
EOSINOPHIL NFR BLD AUTO: 1 %
ERYTHROCYTE [SEDIMENTATION RATE] IN BLOOD BY WESTERGREN METHOD: 13 MM/HR
GLUCOSE SERPL-MCNC: 119 MG/DL
HCT VFR BLD CALC: 38.9 %
HGB BLD-MCNC: 12.1 G/DL
IGA SER QL IEP: 157 MG/DL
IGG SER QL IEP: 847 MG/DL
IGM SER QL IEP: 45 MG/DL
IMM GRANULOCYTES NFR BLD AUTO: 0.3 %
LPL SERPL-CCNC: 91 U/L
LYMPHOCYTES # BLD AUTO: 0.79 K/UL
LYMPHOCYTES NFR BLD AUTO: 20.1 %
MAN DIFF?: NORMAL
MCHC RBC-ENTMCNC: 31.1 GM/DL
MCHC RBC-ENTMCNC: 33.1 PG
MCV RBC AUTO: 106.3 FL
MONOCYTES # BLD AUTO: 0.44 K/UL
MONOCYTES NFR BLD AUTO: 11.2 %
NEUTROPHILS # BLD AUTO: 2.6 K/UL
NEUTROPHILS NFR BLD AUTO: 66.1 %
PLATELET # BLD AUTO: 240 K/UL
POTASSIUM SERPL-SCNC: 4.7 MMOL/L
PROT SERPL-MCNC: 6.2 G/DL
RBC # BLD: 3.66 M/UL
RBC # FLD: 13.8 %
SODIUM SERPL-SCNC: 141 MMOL/L
TSH SERPL-ACNC: 2.55 UIU/ML
WBC # FLD AUTO: 3.93 K/UL

## 2020-11-19 LAB
ALBUMIN SERPL ELPH-MCNC: 4.8 G/DL
ALP BLD-CCNC: 44 U/L
ALT SERPL-CCNC: 21 U/L
AMYLASE/CREAT SERPL: 60 U/L
ANION GAP SERPL CALC-SCNC: 13 MMOL/L
AST SERPL-CCNC: 21 U/L
BASOPHILS # BLD AUTO: 0.04 K/UL
BASOPHILS NFR BLD AUTO: 0.7 %
BILIRUB SERPL-MCNC: 0.4 MG/DL
BUN SERPL-MCNC: 16 MG/DL
CALCIUM SERPL-MCNC: 9.7 MG/DL
CHLORIDE SERPL-SCNC: 100 MMOL/L
CO2 SERPL-SCNC: 29 MMOL/L
CREAT SERPL-MCNC: 0.76 MG/DL
EOSINOPHIL # BLD AUTO: 0.04 K/UL
EOSINOPHIL NFR BLD AUTO: 0.7 %
GLUCOSE SERPL-MCNC: 92 MG/DL
HCT VFR BLD CALC: 41.1 %
HGB BLD-MCNC: 12.9 G/DL
IMM GRANULOCYTES NFR BLD AUTO: 0.2 %
LPL SERPL-CCNC: 41 U/L
LYMPHOCYTES # BLD AUTO: 0.9 K/UL
LYMPHOCYTES NFR BLD AUTO: 16.6 %
MAN DIFF?: NORMAL
MCHC RBC-ENTMCNC: 31.4 GM/DL
MCHC RBC-ENTMCNC: 33.3 PG
MCV RBC AUTO: 106.2 FL
MONOCYTES # BLD AUTO: 0.42 K/UL
MONOCYTES NFR BLD AUTO: 7.7 %
NEUTROPHILS # BLD AUTO: 4.02 K/UL
NEUTROPHILS NFR BLD AUTO: 74.1 %
PLATELET # BLD AUTO: 244 K/UL
POTASSIUM SERPL-SCNC: 4.4 MMOL/L
PROT SERPL-MCNC: 6.9 G/DL
RBC # BLD: 3.87 M/UL
RBC # FLD: 13.8 %
SODIUM SERPL-SCNC: 142 MMOL/L
WBC # FLD AUTO: 5.43 K/UL

## 2020-11-23 ENCOUNTER — NON-APPOINTMENT (OUTPATIENT)
Age: 75
End: 2020-11-23

## 2020-12-18 ENCOUNTER — RX RENEWAL (OUTPATIENT)
Age: 75
End: 2020-12-18

## 2020-12-18 ENCOUNTER — APPOINTMENT (OUTPATIENT)
Dept: RHEUMATOLOGY | Facility: CLINIC | Age: 75
End: 2020-12-18
Payer: MEDICARE

## 2020-12-18 VITALS
WEIGHT: 142 LBS | BODY MASS INDEX: 24.24 KG/M2 | RESPIRATION RATE: 16 BRPM | TEMPERATURE: 97.1 F | HEART RATE: 74 BPM | HEIGHT: 64 IN | OXYGEN SATURATION: 98 % | DIASTOLIC BLOOD PRESSURE: 76 MMHG | SYSTOLIC BLOOD PRESSURE: 124 MMHG

## 2020-12-18 PROCEDURE — 99214 OFFICE O/P EST MOD 30 MIN: CPT | Mod: 25

## 2020-12-18 PROCEDURE — 96372 THER/PROPH/DIAG INJ SC/IM: CPT

## 2020-12-18 RX ORDER — TOFACITINIB 11 MG/1
11 TABLET, FILM COATED, EXTENDED RELEASE ORAL
Qty: 90 | Refills: 0 | Status: DISCONTINUED | COMMUNITY
Start: 2020-01-02 | End: 2020-12-18

## 2020-12-18 RX ORDER — PREDNISONE 2.5 MG/1
2.5 TABLET ORAL
Qty: 24 | Refills: 0 | Status: DISCONTINUED | OUTPATIENT
Start: 2020-09-14 | End: 2020-12-18

## 2020-12-18 RX ORDER — DENOSUMAB 60 MG/ML
60 INJECTION SUBCUTANEOUS
Qty: 1 | Refills: 0 | Status: COMPLETED | OUTPATIENT
Start: 2020-12-18

## 2020-12-18 RX ADMIN — DENOSUMAB 1 MG/ML: 60 INJECTION SUBCUTANEOUS at 00:00

## 2020-12-21 LAB
25(OH)D3 SERPL-MCNC: 40.2 NG/ML
ALBUMIN SERPL ELPH-MCNC: 4.5 G/DL
ALP BLD-CCNC: 47 U/L
ALT SERPL-CCNC: 15 U/L
AMYLASE/CREAT SERPL: 54 U/L
ANION GAP SERPL CALC-SCNC: 14 MMOL/L
AST SERPL-CCNC: 17 U/L
BASOPHILS # BLD AUTO: 0.04 K/UL
BASOPHILS NFR BLD AUTO: 0.8 %
BILIRUB SERPL-MCNC: 0.3 MG/DL
BUN SERPL-MCNC: 19 MG/DL
CALCIUM SERPL-MCNC: 9.8 MG/DL
CHLORIDE SERPL-SCNC: 103 MMOL/L
CO2 SERPL-SCNC: 26 MMOL/L
CREAT SERPL-MCNC: 0.9 MG/DL
CRP SERPL-MCNC: <0.1 MG/DL
EOSINOPHIL # BLD AUTO: 0.03 K/UL
EOSINOPHIL NFR BLD AUTO: 0.6 %
ERYTHROCYTE [SEDIMENTATION RATE] IN BLOOD BY WESTERGREN METHOD: 9 MM/HR
GLUCOSE SERPL-MCNC: 106 MG/DL
HAV IGM SER QL: NONREACTIVE
HBV CORE IGG+IGM SER QL: NONREACTIVE
HBV CORE IGM SER QL: NONREACTIVE
HBV SURFACE AG SER QL: NONREACTIVE
HCT VFR BLD CALC: 38.3 %
HCV AB SER QL: NONREACTIVE
HCV S/CO RATIO: 0.09 S/CO
HGB BLD-MCNC: 11.8 G/DL
IGA SER QL IEP: 141 MG/DL
IGG SER QL IEP: 773 MG/DL
IGM SER QL IEP: 40 MG/DL
IMM GRANULOCYTES NFR BLD AUTO: 0.2 %
LPL SERPL-CCNC: 40 U/L
LYMPHOCYTES # BLD AUTO: 0.92 K/UL
LYMPHOCYTES NFR BLD AUTO: 18.8 %
M TB IFN-G BLD-IMP: NEGATIVE
MAN DIFF?: NORMAL
MCHC RBC-ENTMCNC: 30.8 GM/DL
MCHC RBC-ENTMCNC: 32.1 PG
MCV RBC AUTO: 104.1 FL
MONOCYTES # BLD AUTO: 0.48 K/UL
MONOCYTES NFR BLD AUTO: 9.8 %
NEUTROPHILS # BLD AUTO: 3.41 K/UL
NEUTROPHILS NFR BLD AUTO: 69.8 %
PLATELET # BLD AUTO: 238 K/UL
POTASSIUM SERPL-SCNC: 4.4 MMOL/L
PROT SERPL-MCNC: 6.6 G/DL
QUANTIFERON TB PLUS MITOGEN MINUS NIL: >10 IU/ML
QUANTIFERON TB PLUS NIL: 0.05 IU/ML
QUANTIFERON TB PLUS TB1 MINUS NIL: -0.02 IU/ML
QUANTIFERON TB PLUS TB2 MINUS NIL: -0.02 IU/ML
RBC # BLD: 3.68 M/UL
RBC # FLD: 13.8 %
SODIUM SERPL-SCNC: 142 MMOL/L
WBC # FLD AUTO: 4.89 K/UL

## 2020-12-22 LAB — CELLS.CD3-CD19+/CELLS IN BLOOD: 5 %

## 2020-12-24 ENCOUNTER — TRANSCRIPTION ENCOUNTER (OUTPATIENT)
Age: 75
End: 2020-12-24

## 2021-01-04 ENCOUNTER — TRANSCRIPTION ENCOUNTER (OUTPATIENT)
Age: 76
End: 2021-01-04

## 2021-01-06 ENCOUNTER — TRANSCRIPTION ENCOUNTER (OUTPATIENT)
Age: 76
End: 2021-01-06

## 2021-01-07 ENCOUNTER — TRANSCRIPTION ENCOUNTER (OUTPATIENT)
Age: 76
End: 2021-01-07

## 2021-01-08 ENCOUNTER — TRANSCRIPTION ENCOUNTER (OUTPATIENT)
Age: 76
End: 2021-01-08

## 2021-01-09 ENCOUNTER — TRANSCRIPTION ENCOUNTER (OUTPATIENT)
Age: 76
End: 2021-01-09

## 2021-01-11 ENCOUNTER — TRANSCRIPTION ENCOUNTER (OUTPATIENT)
Age: 76
End: 2021-01-11

## 2021-01-13 ENCOUNTER — APPOINTMENT (OUTPATIENT)
Dept: RHEUMATOLOGY | Facility: CLINIC | Age: 76
End: 2021-01-13

## 2021-02-05 ENCOUNTER — APPOINTMENT (OUTPATIENT)
Dept: RHEUMATOLOGY | Facility: CLINIC | Age: 76
End: 2021-02-05
Payer: MEDICARE

## 2021-02-05 VITALS
HEIGHT: 64 IN | BODY MASS INDEX: 22.88 KG/M2 | TEMPERATURE: 97.2 F | DIASTOLIC BLOOD PRESSURE: 78 MMHG | HEART RATE: 76 BPM | RESPIRATION RATE: 16 BRPM | WEIGHT: 134 LBS | OXYGEN SATURATION: 98 % | SYSTOLIC BLOOD PRESSURE: 121 MMHG

## 2021-02-05 DIAGNOSIS — C91.10 CHRONIC LYMPHOCYTIC LEUKEMIA OF B-CELL TYPE NOT HAVING ACHIEVED REMISSION: ICD-10-CM

## 2021-02-05 PROCEDURE — 99214 OFFICE O/P EST MOD 30 MIN: CPT

## 2021-02-08 ENCOUNTER — TRANSCRIPTION ENCOUNTER (OUTPATIENT)
Age: 76
End: 2021-02-08

## 2021-02-08 ENCOUNTER — LABORATORY RESULT (OUTPATIENT)
Age: 76
End: 2021-02-08

## 2021-02-09 ENCOUNTER — TRANSCRIPTION ENCOUNTER (OUTPATIENT)
Age: 76
End: 2021-02-09

## 2021-02-12 ENCOUNTER — TRANSCRIPTION ENCOUNTER (OUTPATIENT)
Age: 76
End: 2021-02-12

## 2021-02-12 LAB
25(OH)D3 SERPL-MCNC: 40 NG/ML
ALBUMIN SERPL ELPH-MCNC: 4.6 G/DL
ALP BLD-CCNC: 45 U/L
ALT SERPL-CCNC: 23 U/L
AMYLASE/CREAT SERPL: 71 U/L
ANION GAP SERPL CALC-SCNC: 14 MMOL/L
AST SERPL-CCNC: 22 U/L
BASOPHILS # BLD AUTO: 0.08 K/UL
BASOPHILS NFR BLD AUTO: 1.8 %
BILIRUB SERPL-MCNC: 0.3 MG/DL
BUN SERPL-MCNC: 18 MG/DL
CALCIUM SERPL-MCNC: 9.1 MG/DL
CELLS.CD3-CD19+/CELLS IN BLOOD: 3 %
CHLORIDE SERPL-SCNC: 104 MMOL/L
CHOLEST SERPL-MCNC: 170 MG/DL
CO2 SERPL-SCNC: 24 MMOL/L
CREAT SERPL-MCNC: 0.91 MG/DL
CRP SERPL-MCNC: <0.1 MG/DL
EOSINOPHIL # BLD AUTO: 0 K/UL
EOSINOPHIL NFR BLD AUTO: 0 %
ERYTHROCYTE [SEDIMENTATION RATE] IN BLOOD BY WESTERGREN METHOD: 4 MM/HR
ESTIMATED AVERAGE GLUCOSE: 123 MG/DL
GLUCOSE SERPL-MCNC: 103 MG/DL
HAV IGM SER QL: NONREACTIVE
HBA1C MFR BLD HPLC: 5.9 %
HBV CORE IGG+IGM SER QL: NONREACTIVE
HBV CORE IGM SER QL: NONREACTIVE
HBV SURFACE AG SER QL: NONREACTIVE
HCT VFR BLD CALC: 39.9 %
HCV AB SER QL: NONREACTIVE
HCV S/CO RATIO: 0.09 S/CO
HDLC SERPL-MCNC: 73 MG/DL
HGB BLD-MCNC: 12.4 G/DL
IGA SER QL IEP: 142 MG/DL
IGG SER QL IEP: 786 MG/DL
IGM SER QL IEP: 45 MG/DL
LDLC SERPL CALC-MCNC: 87 MG/DL
LPL SERPL-CCNC: 110 U/L
LYMPHOCYTES # BLD AUTO: 0.57 K/UL
LYMPHOCYTES NFR BLD AUTO: 12.2 %
M TB IFN-G BLD-IMP: NEGATIVE
MAN DIFF?: NORMAL
MCHC RBC-ENTMCNC: 31.1 GM/DL
MCHC RBC-ENTMCNC: 32.9 PG
MCV RBC AUTO: 105.8 FL
MONOCYTES # BLD AUTO: 0.65 K/UL
MONOCYTES NFR BLD AUTO: 13.9 %
NEUTROPHILS # BLD AUTO: 3.32 K/UL
NEUTROPHILS NFR BLD AUTO: 70.4 %
NONHDLC SERPL-MCNC: 97 MG/DL
PLATELET # BLD AUTO: 223 K/UL
POTASSIUM SERPL-SCNC: 4.3 MMOL/L
PROT SERPL-MCNC: 6.8 G/DL
QUANTIFERON TB PLUS MITOGEN MINUS NIL: 6.59 IU/ML
QUANTIFERON TB PLUS NIL: 0.02 IU/ML
QUANTIFERON TB PLUS TB1 MINUS NIL: 0 IU/ML
QUANTIFERON TB PLUS TB2 MINUS NIL: 0 IU/ML
RBC # BLD: 3.77 M/UL
RBC # FLD: 14.9 %
SODIUM SERPL-SCNC: 141 MMOL/L
TRIGL SERPL-MCNC: 51 MG/DL
WBC # FLD AUTO: 4.71 K/UL

## 2021-02-22 ENCOUNTER — TRANSCRIPTION ENCOUNTER (OUTPATIENT)
Age: 76
End: 2021-02-22

## 2021-02-22 DIAGNOSIS — K85.90 ACUTE PANCREATITIS WITHOUT NECROSIS OR INFECTION, UNSPECIFIED: ICD-10-CM

## 2021-03-02 ENCOUNTER — LABORATORY RESULT (OUTPATIENT)
Age: 76
End: 2021-03-02

## 2021-03-03 ENCOUNTER — TRANSCRIPTION ENCOUNTER (OUTPATIENT)
Age: 76
End: 2021-03-03

## 2021-03-03 LAB
ALBUMIN SERPL ELPH-MCNC: 4.4 G/DL
ALBUMIN SERPL ELPH-MCNC: 4.7 G/DL
ALBUMIN SERPL ELPH-MCNC: 4.7 G/DL
ALP BLD-CCNC: 43 U/L
ALP BLD-CCNC: 44 U/L
ALP BLD-CCNC: 46 U/L
ALT SERPL-CCNC: 22 U/L
ALT SERPL-CCNC: 24 U/L
ALT SERPL-CCNC: 25 U/L
AMYLASE/CREAT SERPL: 58 U/L
AMYLASE/CREAT SERPL: 59 U/L
AMYLASE/CREAT SERPL: 60 U/L
AMYLASE/CREAT SERPL: 60 U/L
ANION GAP SERPL CALC-SCNC: 10 MMOL/L
ANION GAP SERPL CALC-SCNC: 10 MMOL/L
ANION GAP SERPL CALC-SCNC: 15 MMOL/L
AST SERPL-CCNC: 22 U/L
AST SERPL-CCNC: 23 U/L
AST SERPL-CCNC: 23 U/L
BASOPHILS # BLD AUTO: 0.04 K/UL
BASOPHILS NFR BLD AUTO: 0.7 %
BILIRUB SERPL-MCNC: 0.4 MG/DL
BUN SERPL-MCNC: 15 MG/DL
BUN SERPL-MCNC: 16 MG/DL
BUN SERPL-MCNC: 18 MG/DL
CALCIUM SERPL-MCNC: 9 MG/DL
CALCIUM SERPL-MCNC: 9.2 MG/DL
CALCIUM SERPL-MCNC: 9.2 MG/DL
CHLORIDE SERPL-SCNC: 102 MMOL/L
CHLORIDE SERPL-SCNC: 103 MMOL/L
CHLORIDE SERPL-SCNC: 104 MMOL/L
CO2 SERPL-SCNC: 24 MMOL/L
CO2 SERPL-SCNC: 29 MMOL/L
CO2 SERPL-SCNC: 30 MMOL/L
CREAT SERPL-MCNC: 0.82 MG/DL
CREAT SERPL-MCNC: 0.84 MG/DL
CREAT SERPL-MCNC: 0.91 MG/DL
EOSINOPHIL # BLD AUTO: 0.03 K/UL
EOSINOPHIL NFR BLD AUTO: 0.5 %
GLUCOSE SERPL-MCNC: 104 MG/DL
GLUCOSE SERPL-MCNC: 104 MG/DL
HCT VFR BLD CALC: 39.6 %
HGB BLD-MCNC: 12.5 G/DL
IMM GRANULOCYTES NFR BLD AUTO: 0.4 %
LPL SERPL-CCNC: 35 U/L
LPL SERPL-CCNC: 51 U/L
LPL SERPL-CCNC: 51 U/L
LYMPHOCYTES # BLD AUTO: 0.87 K/UL
LYMPHOCYTES NFR BLD AUTO: 15.9 %
MAN DIFF?: NORMAL
MCHC RBC-ENTMCNC: 31.6 GM/DL
MCHC RBC-ENTMCNC: 33.6 PG
MCV RBC AUTO: 106.5 FL
MONOCYTES # BLD AUTO: 0.66 K/UL
MONOCYTES NFR BLD AUTO: 12.1 %
NEUTROPHILS # BLD AUTO: 3.85 K/UL
NEUTROPHILS NFR BLD AUTO: 70.4 %
PLATELET # BLD AUTO: 239 K/UL
POTASSIUM SERPL-SCNC: 4.5 MMOL/L
POTASSIUM SERPL-SCNC: 4.5 MMOL/L
POTASSIUM SERPL-SCNC: 4.8 MMOL/L
PROT SERPL-MCNC: 6.8 G/DL
PROT SERPL-MCNC: 6.9 G/DL
PROT SERPL-MCNC: 6.9 G/DL
RBC # BLD: 3.72 M/UL
RBC # FLD: 14.6 %
SODIUM SERPL-SCNC: 141 MMOL/L
SODIUM SERPL-SCNC: 142 MMOL/L
SODIUM SERPL-SCNC: 143 MMOL/L
WBC # FLD AUTO: 5.47 K/UL

## 2021-03-04 ENCOUNTER — TRANSCRIPTION ENCOUNTER (OUTPATIENT)
Age: 76
End: 2021-03-04

## 2021-03-08 ENCOUNTER — RX RENEWAL (OUTPATIENT)
Age: 76
End: 2021-03-08

## 2021-03-16 ENCOUNTER — RX RENEWAL (OUTPATIENT)
Age: 76
End: 2021-03-16

## 2021-03-30 ENCOUNTER — APPOINTMENT (OUTPATIENT)
Dept: RHEUMATOLOGY | Facility: CLINIC | Age: 76
End: 2021-03-30
Payer: MEDICARE

## 2021-03-30 VITALS
BODY MASS INDEX: 22.71 KG/M2 | OXYGEN SATURATION: 98 % | RESPIRATION RATE: 16 BRPM | DIASTOLIC BLOOD PRESSURE: 77 MMHG | HEIGHT: 64 IN | SYSTOLIC BLOOD PRESSURE: 127 MMHG | TEMPERATURE: 97.8 F | WEIGHT: 133 LBS | HEART RATE: 78 BPM

## 2021-03-30 PROCEDURE — 99214 OFFICE O/P EST MOD 30 MIN: CPT

## 2021-03-30 RX ORDER — METHOTREXATE 25 MG/ML
50 INJECTION INTRA-ARTERIAL; INTRAMUSCULAR; INTRATHECAL; INTRAVENOUS
Qty: 11 | Refills: 0 | Status: DISCONTINUED | COMMUNITY
Start: 2019-04-29 | End: 2021-03-30

## 2021-04-05 ENCOUNTER — APPOINTMENT (OUTPATIENT)
Dept: RHEUMATOLOGY | Facility: CLINIC | Age: 76
End: 2021-04-05

## 2021-04-16 ENCOUNTER — NON-APPOINTMENT (OUTPATIENT)
Age: 76
End: 2021-04-16

## 2021-04-19 ENCOUNTER — APPOINTMENT (OUTPATIENT)
Dept: RHEUMATOLOGY | Facility: CLINIC | Age: 76
End: 2021-04-19

## 2021-05-24 ENCOUNTER — APPOINTMENT (OUTPATIENT)
Dept: RHEUMATOLOGY | Facility: CLINIC | Age: 76
End: 2021-05-24
Payer: MEDICARE

## 2021-05-24 ENCOUNTER — RESULT REVIEW (OUTPATIENT)
Age: 76
End: 2021-05-24

## 2021-05-24 VITALS
SYSTOLIC BLOOD PRESSURE: 133 MMHG | BODY MASS INDEX: 22.2 KG/M2 | TEMPERATURE: 98 F | OXYGEN SATURATION: 98 % | RESPIRATION RATE: 16 BRPM | HEART RATE: 74 BPM | DIASTOLIC BLOOD PRESSURE: 73 MMHG | WEIGHT: 130 LBS | HEIGHT: 64 IN

## 2021-05-24 DIAGNOSIS — Z00.00 ENCOUNTER FOR GENERAL ADULT MEDICAL EXAMINATION W/OUT ABNORMAL FINDINGS: ICD-10-CM

## 2021-05-24 PROCEDURE — 99214 OFFICE O/P EST MOD 30 MIN: CPT | Mod: CS

## 2021-05-25 ENCOUNTER — TRANSCRIPTION ENCOUNTER (OUTPATIENT)
Age: 76
End: 2021-05-25

## 2021-05-25 LAB
25(OH)D3 SERPL-MCNC: 46.9 NG/ML
ALBUMIN SERPL ELPH-MCNC: 4.6 G/DL
ALP BLD-CCNC: 42 U/L
ALT SERPL-CCNC: 14 U/L
ANION GAP SERPL CALC-SCNC: 16 MMOL/L
AST SERPL-CCNC: 20 U/L
BASOPHILS # BLD AUTO: 0.05 K/UL
BASOPHILS NFR BLD AUTO: 0.8 %
BILIRUB SERPL-MCNC: 0.3 MG/DL
BUN SERPL-MCNC: 19 MG/DL
CALCIUM SERPL-MCNC: 9.8 MG/DL
CELLS.CD3-CD19+/CELLS IN BLOOD: 2 %
CHLORIDE SERPL-SCNC: 103 MMOL/L
CO2 SERPL-SCNC: 24 MMOL/L
COVID-19 SPIKE DOMAIN ANTIBODY INTERPRETATION: POSITIVE
CREAT SERPL-MCNC: 0.76 MG/DL
CRP SERPL-MCNC: <3 MG/L
EOSINOPHIL # BLD AUTO: 0.03 K/UL
EOSINOPHIL NFR BLD AUTO: 0.5 %
ERYTHROCYTE [SEDIMENTATION RATE] IN BLOOD BY WESTERGREN METHOD: 5 MM/HR
GLUCOSE SERPL-MCNC: 88 MG/DL
HCT VFR BLD CALC: 40.4 %
HGB BLD-MCNC: 12.8 G/DL
IGA SER QL IEP: 157 MG/DL
IGG SER QL IEP: 938 MG/DL
IGM SER QL IEP: 55 MG/DL
IMM GRANULOCYTES NFR BLD AUTO: 0.3 %
LYMPHOCYTES # BLD AUTO: 1.06 K/UL
LYMPHOCYTES NFR BLD AUTO: 17.6 %
MAN DIFF?: NORMAL
MCHC RBC-ENTMCNC: 31.7 GM/DL
MCHC RBC-ENTMCNC: 32.7 PG
MCV RBC AUTO: 103.1 FL
MONOCYTES # BLD AUTO: 0.53 K/UL
MONOCYTES NFR BLD AUTO: 8.8 %
NEUTROPHILS # BLD AUTO: 4.33 K/UL
NEUTROPHILS NFR BLD AUTO: 72 %
PLATELET # BLD AUTO: 233 K/UL
POTASSIUM SERPL-SCNC: 4.6 MMOL/L
PROT SERPL-MCNC: 6.9 G/DL
RBC # BLD: 3.92 M/UL
RBC # FLD: 13 %
SARS-COV-2 AB SERPL IA-ACNC: >250 U/ML
SODIUM SERPL-SCNC: 142 MMOL/L
WBC # FLD AUTO: 6.02 K/UL

## 2021-05-27 ENCOUNTER — APPOINTMENT (OUTPATIENT)
Dept: RADIOLOGY | Facility: CLINIC | Age: 76
End: 2021-05-27
Payer: MEDICARE

## 2021-05-27 ENCOUNTER — RESULT REVIEW (OUTPATIENT)
Age: 76
End: 2021-05-27

## 2021-05-27 ENCOUNTER — OUTPATIENT (OUTPATIENT)
Dept: OUTPATIENT SERVICES | Facility: HOSPITAL | Age: 76
LOS: 1 days | End: 2021-05-27
Payer: MEDICARE

## 2021-05-27 ENCOUNTER — TRANSCRIPTION ENCOUNTER (OUTPATIENT)
Age: 76
End: 2021-05-27

## 2021-05-27 DIAGNOSIS — M06.9 RHEUMATOID ARTHRITIS, UNSPECIFIED: ICD-10-CM

## 2021-05-27 DIAGNOSIS — Z98.49 CATARACT EXTRACTION STATUS, UNSPECIFIED EYE: Chronic | ICD-10-CM

## 2021-05-27 PROCEDURE — 73130 X-RAY EXAM OF HAND: CPT | Mod: 26,50

## 2021-05-27 PROCEDURE — 73610 X-RAY EXAM OF ANKLE: CPT | Mod: 26,50

## 2021-05-27 PROCEDURE — 73110 X-RAY EXAM OF WRIST: CPT | Mod: 26,50

## 2021-05-27 PROCEDURE — 73630 X-RAY EXAM OF FOOT: CPT

## 2021-05-27 PROCEDURE — 73630 X-RAY EXAM OF FOOT: CPT | Mod: 26,50

## 2021-05-27 PROCEDURE — 73130 X-RAY EXAM OF HAND: CPT

## 2021-05-27 PROCEDURE — 73610 X-RAY EXAM OF ANKLE: CPT

## 2021-05-27 PROCEDURE — 73110 X-RAY EXAM OF WRIST: CPT

## 2021-05-28 ENCOUNTER — RESULT REVIEW (OUTPATIENT)
Age: 76
End: 2021-05-28

## 2021-05-28 ENCOUNTER — OUTPATIENT (OUTPATIENT)
Dept: OUTPATIENT SERVICES | Facility: HOSPITAL | Age: 76
LOS: 1 days | End: 2021-05-28
Payer: MEDICARE

## 2021-05-28 ENCOUNTER — APPOINTMENT (OUTPATIENT)
Dept: RADIOLOGY | Facility: IMAGING CENTER | Age: 76
End: 2021-05-28
Payer: MEDICARE

## 2021-05-28 DIAGNOSIS — M81.0 AGE-RELATED OSTEOPOROSIS WITHOUT CURRENT PATHOLOGICAL FRACTURE: ICD-10-CM

## 2021-05-28 DIAGNOSIS — Z98.49 CATARACT EXTRACTION STATUS, UNSPECIFIED EYE: Chronic | ICD-10-CM

## 2021-05-28 PROCEDURE — 77080 DXA BONE DENSITY AXIAL: CPT

## 2021-05-28 PROCEDURE — 77080 DXA BONE DENSITY AXIAL: CPT | Mod: 26

## 2021-06-01 ENCOUNTER — TRANSCRIPTION ENCOUNTER (OUTPATIENT)
Age: 76
End: 2021-06-01

## 2021-06-02 ENCOUNTER — TRANSCRIPTION ENCOUNTER (OUTPATIENT)
Age: 76
End: 2021-06-02

## 2021-06-08 ENCOUNTER — TRANSCRIPTION ENCOUNTER (OUTPATIENT)
Age: 76
End: 2021-06-08

## 2021-06-18 ENCOUNTER — APPOINTMENT (OUTPATIENT)
Dept: RHEUMATOLOGY | Facility: CLINIC | Age: 76
End: 2021-06-18
Payer: MEDICARE

## 2021-06-18 VITALS
HEART RATE: 76 BPM | RESPIRATION RATE: 16 BRPM | HEIGHT: 64 IN | BODY MASS INDEX: 21.85 KG/M2 | OXYGEN SATURATION: 98 % | WEIGHT: 128 LBS | DIASTOLIC BLOOD PRESSURE: 72 MMHG | TEMPERATURE: 98.1 F | SYSTOLIC BLOOD PRESSURE: 133 MMHG

## 2021-06-18 PROCEDURE — 96372 THER/PROPH/DIAG INJ SC/IM: CPT

## 2021-06-18 RX ORDER — DENOSUMAB 60 MG/ML
60 INJECTION SUBCUTANEOUS
Qty: 1 | Refills: 0 | Status: COMPLETED | OUTPATIENT
Start: 2021-06-18

## 2021-06-18 RX ADMIN — DENOSUMAB 0 MG/ML: 60 INJECTION SUBCUTANEOUS at 00:00

## 2021-06-29 ENCOUNTER — TRANSCRIPTION ENCOUNTER (OUTPATIENT)
Age: 76
End: 2021-06-29

## 2021-06-30 ENCOUNTER — TRANSCRIPTION ENCOUNTER (OUTPATIENT)
Age: 76
End: 2021-06-30

## 2021-07-08 ENCOUNTER — OUTPATIENT (OUTPATIENT)
Dept: OUTPATIENT SERVICES | Facility: HOSPITAL | Age: 76
LOS: 1 days | End: 2021-07-08
Payer: MEDICARE

## 2021-07-08 ENCOUNTER — APPOINTMENT (OUTPATIENT)
Dept: ULTRASOUND IMAGING | Facility: CLINIC | Age: 76
End: 2021-07-08
Payer: MEDICARE

## 2021-07-08 ENCOUNTER — RESULT REVIEW (OUTPATIENT)
Age: 76
End: 2021-07-08

## 2021-07-08 ENCOUNTER — TRANSCRIPTION ENCOUNTER (OUTPATIENT)
Age: 76
End: 2021-07-08

## 2021-07-08 DIAGNOSIS — M81.0 AGE-RELATED OSTEOPOROSIS WITHOUT CURRENT PATHOLOGICAL FRACTURE: ICD-10-CM

## 2021-07-08 DIAGNOSIS — Z98.49 CATARACT EXTRACTION STATUS, UNSPECIFIED EYE: Chronic | ICD-10-CM

## 2021-07-08 DIAGNOSIS — M06.9 RHEUMATOID ARTHRITIS, UNSPECIFIED: ICD-10-CM

## 2021-07-08 PROCEDURE — 76882 US LMTD JT/FCL EVL NVASC XTR: CPT

## 2021-07-08 PROCEDURE — 76882 US LMTD JT/FCL EVL NVASC XTR: CPT | Mod: 26,RT

## 2021-07-09 ENCOUNTER — TRANSCRIPTION ENCOUNTER (OUTPATIENT)
Age: 76
End: 2021-07-09

## 2021-07-12 ENCOUNTER — TRANSCRIPTION ENCOUNTER (OUTPATIENT)
Age: 76
End: 2021-07-12

## 2021-07-14 ENCOUNTER — TRANSCRIPTION ENCOUNTER (OUTPATIENT)
Age: 76
End: 2021-07-14

## 2021-07-16 ENCOUNTER — TRANSCRIPTION ENCOUNTER (OUTPATIENT)
Age: 76
End: 2021-07-16

## 2021-07-29 ENCOUNTER — APPOINTMENT (OUTPATIENT)
Dept: RHEUMATOLOGY | Facility: CLINIC | Age: 76
End: 2021-07-29
Payer: MEDICARE

## 2021-07-29 ENCOUNTER — LABORATORY RESULT (OUTPATIENT)
Age: 76
End: 2021-07-29

## 2021-07-29 PROCEDURE — 96415 CHEMO IV INFUSION ADDL HR: CPT

## 2021-07-29 PROCEDURE — 96375 TX/PRO/DX INJ NEW DRUG ADDON: CPT

## 2021-07-29 PROCEDURE — 96413 CHEMO IV INFUSION 1 HR: CPT

## 2021-07-29 PROCEDURE — 36415 COLL VENOUS BLD VENIPUNCTURE: CPT

## 2021-07-30 ENCOUNTER — RX RENEWAL (OUTPATIENT)
Age: 76
End: 2021-07-30

## 2021-08-02 ENCOUNTER — TRANSCRIPTION ENCOUNTER (OUTPATIENT)
Age: 76
End: 2021-08-02

## 2021-08-03 ENCOUNTER — TRANSCRIPTION ENCOUNTER (OUTPATIENT)
Age: 76
End: 2021-08-03

## 2021-08-03 DIAGNOSIS — R11.0 NAUSEA: ICD-10-CM

## 2021-08-16 ENCOUNTER — LABORATORY RESULT (OUTPATIENT)
Age: 76
End: 2021-08-16

## 2021-08-16 ENCOUNTER — APPOINTMENT (OUTPATIENT)
Dept: RHEUMATOLOGY | Facility: CLINIC | Age: 76
End: 2021-08-16
Payer: MEDICARE

## 2021-08-16 PROCEDURE — 96413 CHEMO IV INFUSION 1 HR: CPT

## 2021-08-16 PROCEDURE — 96415 CHEMO IV INFUSION ADDL HR: CPT

## 2021-08-16 PROCEDURE — 36415 COLL VENOUS BLD VENIPUNCTURE: CPT

## 2021-08-16 PROCEDURE — 96375 TX/PRO/DX INJ NEW DRUG ADDON: CPT

## 2021-08-24 ENCOUNTER — APPOINTMENT (OUTPATIENT)
Dept: RHEUMATOLOGY | Facility: CLINIC | Age: 76
End: 2021-08-24

## 2021-09-14 NOTE — H&P PST ADULT - NSSUBSTANCEUSE_GEN_ALL_CORE_SD
MD Patti Mancuso, ELVI; KAMI Orellana fernando Gi Nurse Msg Alonzo; Jackelyn Vann, RN  Will do   AJ can you please schedule this Friday   Thanks           Previous Messages       ----- Message -----   From: Patti Krishnamurthy RN   Sent: 9/14/2021  11:22 AM CDT   To: Delilah Aden MD, *   Subject: overdue for colonoscopy, new pancreatic canc*     Hello,   Patient is overdue for her colonoscopy. She is requesting Dr. Aden to perform if possible.   She states she has not heard recently from GI about scheduling.     New diagnosis of pancreatic cancer, she has had imaging showing no radiographic concerns. Her Chemotherapy soon so ideally it would be nice to get this done sooner than later.     Can you please reach out to patient at your earliest convenience?     Thanks   Pratibha Krishnamurthy, CARLITO Painter/Dr. Samaniego   [phone]      caffeine

## 2021-09-21 ENCOUNTER — APPOINTMENT (OUTPATIENT)
Dept: RHEUMATOLOGY | Facility: CLINIC | Age: 76
End: 2021-09-21
Payer: MEDICARE

## 2021-09-21 VITALS
BODY MASS INDEX: 21.85 KG/M2 | OXYGEN SATURATION: 97 % | DIASTOLIC BLOOD PRESSURE: 64 MMHG | SYSTOLIC BLOOD PRESSURE: 110 MMHG | TEMPERATURE: 97.3 F | WEIGHT: 128 LBS | HEART RATE: 81 BPM | HEIGHT: 64 IN

## 2021-09-21 DIAGNOSIS — Z20.822 CONTACT WITH AND (SUSPECTED) EXPOSURE TO COVID-19: ICD-10-CM

## 2021-09-21 PROCEDURE — 99214 OFFICE O/P EST MOD 30 MIN: CPT | Mod: 25

## 2021-09-21 PROCEDURE — 20610 DRAIN/INJ JOINT/BURSA W/O US: CPT | Mod: LT

## 2021-09-21 RX ORDER — LIDOCAINE HYDROCHLORIDE 10 MG/ML
1 INJECTION, SOLUTION INFILTRATION; PERINEURAL
Refills: 0 | Status: COMPLETED | OUTPATIENT
Start: 2021-09-21

## 2021-09-21 RX ORDER — METHYLPRED ACET/NACL,ISO-OS/PF 40 MG/ML
40 VIAL (ML) INJECTION
Qty: 1 | Refills: 0 | Status: COMPLETED | OUTPATIENT
Start: 2021-09-21

## 2021-09-21 RX ORDER — METHOTREXATE 25 MG/ML
50 INJECTION INTRA-ARTERIAL; INTRAMUSCULAR; INTRATHECAL; INTRAVENOUS WEEKLY
Qty: 4 | Refills: 0 | Status: DISCONTINUED | COMMUNITY
Start: 2020-05-25 | End: 2021-09-21

## 2021-09-21 RX ORDER — PREDNISONE 2.5 MG/1
2.5 TABLET ORAL DAILY
Qty: 90 | Refills: 0 | Status: DISCONTINUED | COMMUNITY
Start: 2020-11-03 | End: 2021-09-21

## 2021-09-21 RX ADMIN — METHYLPREDNISOLONE ACETATE 1 MG/ML: 40 INJECTION, SUSPENSION INTRA-ARTICULAR; INTRALESIONAL; INTRAMUSCULAR; SOFT TISSUE at 00:00

## 2021-09-21 RX ADMIN — LIDOCAINE HYDROCHLORIDE %: 10 INJECTION, SOLUTION INFILTRATION; PERINEURAL at 00:00

## 2021-09-22 ENCOUNTER — TRANSCRIPTION ENCOUNTER (OUTPATIENT)
Age: 76
End: 2021-09-22

## 2021-09-22 LAB
25(OH)D3 SERPL-MCNC: 40.5 NG/ML
ALBUMIN SERPL ELPH-MCNC: 4.5 G/DL
ALP BLD-CCNC: 41 U/L
ALT SERPL-CCNC: 21 U/L
ANION GAP SERPL CALC-SCNC: 14 MMOL/L
AST SERPL-CCNC: 19 U/L
BASOPHILS # BLD AUTO: 0.03 K/UL
BASOPHILS NFR BLD AUTO: 0.6 %
BILIRUB SERPL-MCNC: 0.3 MG/DL
BUN SERPL-MCNC: 17 MG/DL
CALCIUM SERPL-MCNC: 9.7 MG/DL
CELLS.CD3-CD19+/CELLS IN BLOOD: <1 %
CHLORIDE SERPL-SCNC: 102 MMOL/L
CO2 SERPL-SCNC: 28 MMOL/L
COVID-19 NUCLEOCAPSID  GAM ANTIBODY INTERPRETATION: NEGATIVE
COVID-19 SPIKE DOMAIN ANTIBODY INTERPRETATION: POSITIVE
CREAT SERPL-MCNC: 0.74 MG/DL
CRP SERPL-MCNC: <3 MG/L
EOSINOPHIL # BLD AUTO: 0.03 K/UL
EOSINOPHIL NFR BLD AUTO: 0.6 %
ERYTHROCYTE [SEDIMENTATION RATE] IN BLOOD BY WESTERGREN METHOD: 9 MM/HR
GLUCOSE SERPL-MCNC: 98 MG/DL
HCT VFR BLD CALC: 38.2 %
HGB BLD-MCNC: 12.5 G/DL
IGA SER QL IEP: 140 MG/DL
IGG SER QL IEP: 818 MG/DL
IGM SER QL IEP: 43 MG/DL
IMM GRANULOCYTES NFR BLD AUTO: 0.2 %
LYMPHOCYTES # BLD AUTO: 1.07 K/UL
LYMPHOCYTES NFR BLD AUTO: 22.2 %
MAN DIFF?: NORMAL
MCHC RBC-ENTMCNC: 32.7 GM/DL
MCHC RBC-ENTMCNC: 33.2 PG
MCV RBC AUTO: 101.6 FL
MONOCYTES # BLD AUTO: 0.63 K/UL
MONOCYTES NFR BLD AUTO: 13.1 %
NEUTROPHILS # BLD AUTO: 3.04 K/UL
NEUTROPHILS NFR BLD AUTO: 63.3 %
PLATELET # BLD AUTO: 226 K/UL
POTASSIUM SERPL-SCNC: 4.6 MMOL/L
PROT SERPL-MCNC: 6.5 G/DL
RBC # BLD: 3.76 M/UL
RBC # FLD: 13.8 %
SARS-COV-2 AB SERPL IA-ACNC: 204 U/ML
SARS-COV-2 AB SERPL QL IA: 0.06 INDEX
SODIUM SERPL-SCNC: 144 MMOL/L
WBC # FLD AUTO: 4.81 K/UL

## 2021-10-01 ENCOUNTER — TRANSCRIPTION ENCOUNTER (OUTPATIENT)
Age: 76
End: 2021-10-01

## 2021-10-11 ENCOUNTER — TRANSCRIPTION ENCOUNTER (OUTPATIENT)
Age: 76
End: 2021-10-11

## 2021-10-18 ENCOUNTER — TRANSCRIPTION ENCOUNTER (OUTPATIENT)
Age: 76
End: 2021-10-18

## 2021-10-20 ENCOUNTER — TRANSCRIPTION ENCOUNTER (OUTPATIENT)
Age: 76
End: 2021-10-20

## 2021-10-21 ENCOUNTER — TRANSCRIPTION ENCOUNTER (OUTPATIENT)
Age: 76
End: 2021-10-21

## 2021-11-05 ENCOUNTER — APPOINTMENT (OUTPATIENT)
Dept: ORTHOPEDIC SURGERY | Facility: CLINIC | Age: 76
End: 2021-11-05
Payer: MEDICARE

## 2021-11-05 VITALS
HEART RATE: 85 BPM | WEIGHT: 128 LBS | DIASTOLIC BLOOD PRESSURE: 79 MMHG | BODY MASS INDEX: 21.33 KG/M2 | HEIGHT: 65 IN | SYSTOLIC BLOOD PRESSURE: 129 MMHG

## 2021-11-05 DIAGNOSIS — Z87.39 PERSONAL HISTORY OF OTHER DISEASES OF THE MUSCULOSKELETAL SYSTEM AND CONNECTIVE TISSUE: ICD-10-CM

## 2021-11-05 DIAGNOSIS — Z85.820 PERSONAL HISTORY OF MALIGNANT MELANOMA OF SKIN: ICD-10-CM

## 2021-11-05 PROCEDURE — 73130 X-RAY EXAM OF HAND: CPT | Mod: LT

## 2021-11-05 PROCEDURE — 99204 OFFICE O/P NEW MOD 45 MIN: CPT

## 2021-11-05 RX ORDER — LEVOTHYROXINE SODIUM 0.05 MG/1
50 TABLET ORAL
Refills: 0 | Status: ACTIVE | COMMUNITY

## 2021-11-05 RX ORDER — ESOMEPRAZOLE MAGNESIUM 40 MG/1
40 CAPSULE, DELAYED RELEASE ORAL
Refills: 0 | Status: ACTIVE | COMMUNITY

## 2021-11-05 RX ORDER — VALACYCLOVIR 500 MG/1
500 TABLET, FILM COATED ORAL
Refills: 0 | Status: ACTIVE | COMMUNITY

## 2021-11-05 NOTE — ADDENDUM
[FreeTextEntry1] : I, Farhana Villarreal, acted solely as a scribe for Dr. Maldonado on this date on 11/05/2021.

## 2021-11-05 NOTE — DISCUSSION/SUMMARY
[FreeTextEntry1] : She has findings consistent with rheumatoid arthritis at the left hand mostly affecting the middle finger PIP joint.\par \par I had a discussion with the patient regarding today's visit, the prognosis of this diagnosis, and treatment recommendations and options. At this time, we discussed treatment options of a cortisone injection versus surgical management of a joint replacement. She has elected to proceed with a cortisone injection into the left middle PIP joint. However, as she just received the COVID booster vaccination this past Monday on 11/1/21, I told her that I cannot give her the injection for two weeks. She will follow up in 2 weeks for the cortisone injection.\par \par She has agreed to the above plan of management and has expressed full understanding.  All questions were fully answered to their satisfaction. \par \par My cumulative time spent on this visit included: Preparation for the visit, review of the medical records, review of pertinent diagnostic studies, examination and counseling of the patient on the above diagnosis, treatment plan and prognosis, orders of diagnostic tests, medication and/or appropriate procedures and documentation in the medical records of today's visit.

## 2021-11-05 NOTE — PHYSICAL EXAM
[de-identified] : - Constitutional: This is a female in no obvious distress.  \par - Psych: Patient is alert and oriented to person, place and time.  Patient has a normal mood and affect.\par - Cardiovascular: Normal pulses throughout the upper extremities.  No significant varicosities are noted in the upper extremities. \par - Neuro: Strength and sensation are intact throughout the upper extremities.  Patient has normal coordination.\par - Respiratory:  Patient exhibits no evidence of shortness of breath or difficulty breathing.\par - Skin: No rashes, lesions, or other abnormalities are noted in the upper extremities.\par \par --- \par \par Examination of her left hand demonstrates obvious arthritis of the PIP and DIP joints.  She has swelling most notably at the left middle finger PIP joint with tenderness.  There is also mild tenderness along the middle finger A1 pulley without triggering.  She is quite tender along the middle finger PIP joint and has limitation of flexion and extension.  There is obvious arthritis of the DIP joints of the digits.  She is neurovascular intact distally. [de-identified] : AP, lateral, and oblique radiographs of the left hand and lateral radiographs of the left middle finger demonstrate arthritis throughout the digits, most notably at the left middle finger PIP joint as well as at the DIP joints of the digits.  There is also arthritis at the IP joint of the thumb and the left thumb CMC joint.  At the left middle finger she has arthritis at the PIP joint with a large dorsal bone spur as well as advanced arthritis at the DIP joint.  There is no obvious radiocarpal joint arthritis.

## 2021-11-05 NOTE — END OF VISIT
[FreeTextEntry3] : This note was written by Farhana Villarreal on 11/05/2021 acting solely as a scribe for Dr. Jacob Maldonado.\par  \par All medical record entries made by the Scribe were at my, Dr. Jacob Maldonado, direction and personally dictated by me on 11/05/2021. I have personally reviewed the chart and agree that the record accurately reflects my personal performance of the history, physical exam, assessment and plan.

## 2021-11-05 NOTE — HISTORY OF PRESENT ILLNESS
[Right] : right hand dominant [FreeTextEntry1] : She comes in today for evaluation of left hand pain secondary to RA. She notes the pain wakes her up at night. She rates her pain as an 8 out of 10 at this time. \par \par She received the COVID booster vaccination on Monday 11/1/21.\par \par She has a history of RA and his on Methotrexate and Hydroxychloroquine.

## 2021-11-12 ENCOUNTER — NON-APPOINTMENT (OUTPATIENT)
Age: 76
End: 2021-11-12

## 2021-11-24 ENCOUNTER — APPOINTMENT (OUTPATIENT)
Dept: ORTHOPEDIC SURGERY | Facility: CLINIC | Age: 76
End: 2021-11-24
Payer: MEDICARE

## 2021-11-25 ENCOUNTER — NON-APPOINTMENT (OUTPATIENT)
Age: 76
End: 2021-11-25

## 2021-12-08 ENCOUNTER — APPOINTMENT (OUTPATIENT)
Dept: ORTHOPEDIC SURGERY | Facility: CLINIC | Age: 76
End: 2021-12-08
Payer: MEDICARE

## 2021-12-08 DIAGNOSIS — M79.642 PAIN IN LEFT HAND: ICD-10-CM

## 2021-12-08 PROCEDURE — 20604 DRAIN/INJ JOINT/BURSA W/US: CPT | Mod: F2

## 2021-12-08 PROCEDURE — 99214 OFFICE O/P EST MOD 30 MIN: CPT | Mod: 25

## 2021-12-08 RX ADMIN — METHYLPREDNISOLONE ACETATE 1 MG/ML: 40 INJECTION, SUSPENSION INTRALESIONAL; INTRAMUSCULAR; INTRASYNOVIAL; SOFT TISSUE at 00:00

## 2021-12-08 NOTE — DISCUSSION/SUMMARY
[FreeTextEntry1] : I had a discussion regarding today's visit, the diagnosis and treatment recommendations and options.  We also discussed changes since the last visit.  At this time, she has agreed to proceed with a cortisone injection to the left middle finger PIP joint.  She does understand that this may not ultimately provide her with long-term relief.\par \par The patient has agreed to the above plan of management and has expressed full understanding.  All questions were fully answered to the patient's satisfaction.\par \par My cumulative time spent on today's visit was greater than 30 minutes and included: Preparation for the visit, review of the medical records, review of pertinent diagnostic studies, examination and counseling of the patient on the above diagnosis, treatment plan and prognosis, orders of diagnostic tests, medications and/or appropriate procedures and documentation in the medical records of today's visit.

## 2021-12-08 NOTE — PHYSICAL EXAM
[de-identified] : - Constitutional: This is a female in no obvious distress.  \par - Psych: Patient is alert and oriented to person, place and time.  Patient has a normal mood and affect.\par - Cardiovascular: Normal pulses throughout the upper extremities.  No significant varicosities are noted in the upper extremities. \par - Neuro: Strength and sensation are intact throughout the upper extremities.  Patient has normal coordination.\par - Respiratory:  Patient exhibits no evidence of shortness of breath or difficulty breathing.\par - Skin: No rashes, lesions, or other abnormalities are noted in the upper extremities.\par \par --- \par \par Examination of her left hand demonstrates obvious arthritis of the PIP and DIP joints.  She has swelling most notably at the left middle finger PIP joint with tenderness.  There is also mild tenderness along the middle finger A1 pulley without triggering.  She is quite tender along the middle finger PIP joint and has limitation of flexion and extension.  There is obvious arthritis of the DIP joints of the digits.  She is neurovascular intact distally. [de-identified] : Previous AP, lateral, and oblique radiographs of the left hand and lateral radiographs of the left middle finger demonstrated arthritis throughout the digits, most notably at the left middle finger PIP joint as well as at the DIP joints of the digits.  There is also arthritis at the IP joint of the thumb and the left thumb CMC joint.  At the left middle finger she has arthritis at the PIP joint with a large dorsal bone spur as well as advanced arthritis at the DIP joint.  There is no obvious radiocarpal joint arthritis.

## 2021-12-08 NOTE — HISTORY OF PRESENT ILLNESS
[FreeTextEntry1] : Follow-up regarding rheumatoid arthritis at the left hand mostly affecting the middle finger PIP joint.\par \par See note from when she was seen in the office 33 days ago.  She agreed to proceed with a cortisone injection at the left middle finger PIP joint.  However, this had to be delayed, as she had recently received the COVID vaccination.\par \par She returns today,\par \par She has a history of RA and his on Methotrexate and Hydroxychloroquine.

## 2021-12-08 NOTE — END OF VISIT
[FreeTextEntry3] : This note was written by Farhana Villarreal on 12/08/2021 acting solely as a scribe for Dr. Jacob Maldonado.\par  \par All medical record entries made by the Scribe were at my, Dr. Jacob Maldonado, direction and personally dictated by me on 12/08/2021. I have personally reviewed the chart and agree that the record accurately reflects my personal performance of the history, physical exam, assessment and plan.

## 2021-12-08 NOTE — PROCEDURE
[FreeTextEntry1] : - After a discussion of risks and benefits, the patient agreed to proceed with a cortisone injection.  \par -  Side Injected: Left middle finger PIP joint. Ultrasound guidance was used to confirm the injection within the the left middle finger PIP joint. \par -  Medications injected: 1cc of 40mg of Depomedrol, using sterile technique.\par -  Patient tolerated the procedure well, without complications.\par -  Patient was told that the pain may worsen for a day or two, and should then begin to improve.\par -  Instructions: The patient was instructed on the use of ice, anti-inflammatory agents, or Tylenol, and activity modification.\par -  Follow-up: Within 4 weeks to assess the response to the injection.

## 2021-12-08 NOTE — ADDENDUM
[FreeTextEntry1] : I, Farhana Villarreal, acted solely as a scribe for Dr. Maldonado on this date on 12/08/2021.

## 2021-12-16 PROBLEM — M79.642 PAIN OF LEFT HAND: Status: ACTIVE | Noted: 2021-12-16

## 2021-12-16 RX ORDER — METHYLPRED ACET/NACL,ISO-OS/PF 40 MG/ML
40 VIAL (ML) INJECTION
Qty: 1 | Refills: 0 | Status: COMPLETED | OUTPATIENT
Start: 2021-12-08

## 2021-12-21 ENCOUNTER — LABORATORY RESULT (OUTPATIENT)
Age: 76
End: 2021-12-21

## 2021-12-21 ENCOUNTER — APPOINTMENT (OUTPATIENT)
Dept: RHEUMATOLOGY | Facility: CLINIC | Age: 76
End: 2021-12-21
Payer: MEDICARE

## 2021-12-21 VITALS
HEART RATE: 64 BPM | WEIGHT: 128 LBS | SYSTOLIC BLOOD PRESSURE: 118 MMHG | TEMPERATURE: 96.7 F | HEIGHT: 64 IN | BODY MASS INDEX: 21.85 KG/M2 | OXYGEN SATURATION: 98 % | DIASTOLIC BLOOD PRESSURE: 85 MMHG

## 2021-12-21 DIAGNOSIS — Z20.822 CONTACT WITH AND (SUSPECTED) EXPOSURE TO COVID-19: ICD-10-CM

## 2021-12-21 PROCEDURE — 99214 OFFICE O/P EST MOD 30 MIN: CPT | Mod: 25

## 2021-12-21 PROCEDURE — 96372 THER/PROPH/DIAG INJ SC/IM: CPT

## 2021-12-21 RX ORDER — DENOSUMAB 60 MG/ML
60 INJECTION SUBCUTANEOUS
Qty: 1 | Refills: 0 | Status: COMPLETED | OUTPATIENT
Start: 2021-12-21

## 2021-12-21 RX ADMIN — DENOSUMAB 1 MG/ML: 60 INJECTION SUBCUTANEOUS at 00:00

## 2021-12-22 ENCOUNTER — TRANSCRIPTION ENCOUNTER (OUTPATIENT)
Age: 76
End: 2021-12-22

## 2021-12-22 LAB
25(OH)D3 SERPL-MCNC: 36 NG/ML
ALBUMIN SERPL ELPH-MCNC: 4.4 G/DL
ALP BLD-CCNC: 54 U/L
ALT SERPL-CCNC: 78 U/L
ANION GAP SERPL CALC-SCNC: 14 MMOL/L
AST SERPL-CCNC: 71 U/L
BASOPHILS # BLD AUTO: 0.03 K/UL
BASOPHILS NFR BLD AUTO: 0.6 %
BILIRUB SERPL-MCNC: 0.2 MG/DL
BUN SERPL-MCNC: 19 MG/DL
CALCIUM SERPL-MCNC: 9.3 MG/DL
CELLS.CD3-CD19+/CELLS IN BLOOD: <1 %
CHLORIDE SERPL-SCNC: 104 MMOL/L
CO2 SERPL-SCNC: 26 MMOL/L
COVID-19 SPIKE DOMAIN ANTIBODY INTERPRETATION: POSITIVE
CREAT SERPL-MCNC: 0.81 MG/DL
CRP SERPL-MCNC: 6 MG/L
EOSINOPHIL # BLD AUTO: 0.04 K/UL
EOSINOPHIL NFR BLD AUTO: 0.7 %
ERYTHROCYTE [SEDIMENTATION RATE] IN BLOOD BY WESTERGREN METHOD: 23 MM/HR
GLUCOSE SERPL-MCNC: 112 MG/DL
HCT VFR BLD CALC: 40.1 %
HGB BLD-MCNC: 12.6 G/DL
IGA SER QL IEP: 133 MG/DL
IGG SER QL IEP: 814 MG/DL
IGM SER QL IEP: 48 MG/DL
IMM GRANULOCYTES NFR BLD AUTO: 0.2 %
LYMPHOCYTES # BLD AUTO: 0.96 K/UL
LYMPHOCYTES NFR BLD AUTO: 18 %
MAN DIFF?: NORMAL
MCHC RBC-ENTMCNC: 31.4 GM/DL
MCHC RBC-ENTMCNC: 33.1 PG
MCV RBC AUTO: 105.2 FL
MONOCYTES # BLD AUTO: 0.45 K/UL
MONOCYTES NFR BLD AUTO: 8.4 %
NEUTROPHILS # BLD AUTO: 3.85 K/UL
NEUTROPHILS NFR BLD AUTO: 72.1 %
PLATELET # BLD AUTO: 244 K/UL
POTASSIUM SERPL-SCNC: 4.5 MMOL/L
PROT SERPL-MCNC: 6.9 G/DL
RBC # BLD: 3.81 M/UL
RBC # FLD: 13.5 %
SARS-COV-2 AB SERPL IA-ACNC: 197 U/ML
SODIUM SERPL-SCNC: 143 MMOL/L
WBC # FLD AUTO: 5.34 K/UL

## 2022-01-22 ENCOUNTER — RX RENEWAL (OUTPATIENT)
Age: 77
End: 2022-01-22

## 2022-01-31 ENCOUNTER — TRANSCRIPTION ENCOUNTER (OUTPATIENT)
Age: 77
End: 2022-01-31

## 2022-02-02 ENCOUNTER — TRANSCRIPTION ENCOUNTER (OUTPATIENT)
Age: 77
End: 2022-02-02

## 2022-02-02 LAB
ALBUMIN SERPL ELPH-MCNC: 4.4 G/DL
ALP BLD-CCNC: 47 U/L
ALT SERPL-CCNC: 14 U/L
ANION GAP SERPL CALC-SCNC: 11 MMOL/L
AST SERPL-CCNC: 17 U/L
BILIRUB SERPL-MCNC: 0.3 MG/DL
BUN SERPL-MCNC: 21 MG/DL
CALCIUM SERPL-MCNC: 9.4 MG/DL
CHLORIDE SERPL-SCNC: 102 MMOL/L
CK SERPL-CCNC: 75 U/L
CO2 SERPL-SCNC: 27 MMOL/L
CREAT SERPL-MCNC: 0.83 MG/DL
GGT SERPL-CCNC: 14 U/L
GLUCOSE SERPL-MCNC: 109 MG/DL
POTASSIUM SERPL-SCNC: 4.5 MMOL/L
PROT SERPL-MCNC: 6.7 G/DL
SODIUM SERPL-SCNC: 140 MMOL/L

## 2022-02-03 ENCOUNTER — TRANSCRIPTION ENCOUNTER (OUTPATIENT)
Age: 77
End: 2022-02-03

## 2022-02-03 ENCOUNTER — RX RENEWAL (OUTPATIENT)
Age: 77
End: 2022-02-03

## 2022-02-06 ENCOUNTER — RX RENEWAL (OUTPATIENT)
Age: 77
End: 2022-02-06

## 2022-02-07 ENCOUNTER — TRANSCRIPTION ENCOUNTER (OUTPATIENT)
Age: 77
End: 2022-02-07

## 2022-02-23 ENCOUNTER — TRANSCRIPTION ENCOUNTER (OUTPATIENT)
Age: 77
End: 2022-02-23

## 2022-03-29 ENCOUNTER — APPOINTMENT (OUTPATIENT)
Dept: RHEUMATOLOGY | Facility: CLINIC | Age: 77
End: 2022-03-29
Payer: MEDICARE

## 2022-03-29 VITALS
BODY MASS INDEX: 23.42 KG/M2 | SYSTOLIC BLOOD PRESSURE: 136 MMHG | DIASTOLIC BLOOD PRESSURE: 76 MMHG | HEIGHT: 64 IN | OXYGEN SATURATION: 98 % | WEIGHT: 137.2 LBS | TEMPERATURE: 97.4 F | HEART RATE: 81 BPM

## 2022-03-29 DIAGNOSIS — C7A.8 OTHER MALIGNANT NEUROENDOCRINE TUMORS: ICD-10-CM

## 2022-03-29 DIAGNOSIS — M25.512 PAIN IN LEFT SHOULDER: ICD-10-CM

## 2022-03-29 PROCEDURE — 99215 OFFICE O/P EST HI 40 MIN: CPT

## 2022-03-29 RX ORDER — DOXYCYCLINE 100 MG/1
100 CAPSULE ORAL
Qty: 6 | Refills: 0 | Status: COMPLETED | COMMUNITY
Start: 2022-02-28

## 2022-03-29 RX ORDER — PREDNISONE 20 MG/1
20 TABLET ORAL
Qty: 5 | Refills: 0 | Status: COMPLETED | COMMUNITY
Start: 2022-02-28

## 2022-03-30 ENCOUNTER — TRANSCRIPTION ENCOUNTER (OUTPATIENT)
Age: 77
End: 2022-03-30

## 2022-03-30 LAB
25(OH)D3 SERPL-MCNC: 34.2 NG/ML
ALBUMIN SERPL ELPH-MCNC: 4.4 G/DL
ALP BLD-CCNC: 42 U/L
ALT SERPL-CCNC: 15 U/L
ANION GAP SERPL CALC-SCNC: 14 MMOL/L
AST SERPL-CCNC: 19 U/L
BASOPHILS # BLD AUTO: 0.02 K/UL
BASOPHILS NFR BLD AUTO: 0.5 %
BILIRUB SERPL-MCNC: 0.3 MG/DL
BUN SERPL-MCNC: 22 MG/DL
CALCIUM SERPL-MCNC: 9.4 MG/DL
CELLS.CD3-CD19+/CELLS IN BLOOD: <1 %
CHLORIDE SERPL-SCNC: 107 MMOL/L
CO2 SERPL-SCNC: 24 MMOL/L
CREAT SERPL-MCNC: 0.75 MG/DL
CRP SERPL-MCNC: <3 MG/L
EGFR: 82 ML/MIN/1.73M2
EOSINOPHIL # BLD AUTO: 0.03 K/UL
EOSINOPHIL NFR BLD AUTO: 0.8 %
GLUCOSE SERPL-MCNC: 98 MG/DL
HAV IGM SER QL: NONREACTIVE
HBV CORE IGG+IGM SER QL: NONREACTIVE
HBV CORE IGM SER QL: NONREACTIVE
HBV SURFACE AG SER QL: NONREACTIVE
HCT VFR BLD CALC: 39 %
HCV AB SER QL: NONREACTIVE
HCV S/CO RATIO: 0.09 S/CO
HGB BLD-MCNC: 12.3 G/DL
IGA SER QL IEP: 138 MG/DL
IGG SER QL IEP: 869 MG/DL
IGM SER QL IEP: 41 MG/DL
IMM GRANULOCYTES NFR BLD AUTO: 0.3 %
LYMPHOCYTES # BLD AUTO: 1.06 K/UL
LYMPHOCYTES NFR BLD AUTO: 27.1 %
MAN DIFF?: NORMAL
MCHC RBC-ENTMCNC: 31.5 GM/DL
MCHC RBC-ENTMCNC: 32.3 PG
MCV RBC AUTO: 102.4 FL
MONOCYTES # BLD AUTO: 0.47 K/UL
MONOCYTES NFR BLD AUTO: 12 %
NEUTROPHILS # BLD AUTO: 2.32 K/UL
NEUTROPHILS NFR BLD AUTO: 59.3 %
PLATELET # BLD AUTO: 232 K/UL
POTASSIUM SERPL-SCNC: 4.2 MMOL/L
PROT SERPL-MCNC: 6.6 G/DL
RBC # BLD: 3.81 M/UL
RBC # FLD: 13.1 %
SODIUM SERPL-SCNC: 144 MMOL/L
WBC # FLD AUTO: 3.91 K/UL

## 2022-03-31 ENCOUNTER — TRANSCRIPTION ENCOUNTER (OUTPATIENT)
Age: 77
End: 2022-03-31

## 2022-04-01 ENCOUNTER — TRANSCRIPTION ENCOUNTER (OUTPATIENT)
Age: 77
End: 2022-04-01

## 2022-04-02 LAB
M TB IFN-G BLD-IMP: NEGATIVE
QUANTIFERON TB PLUS MITOGEN MINUS NIL: 9.9 IU/ML
QUANTIFERON TB PLUS NIL: 0.1 IU/ML
QUANTIFERON TB PLUS TB1 MINUS NIL: -0.01 IU/ML
QUANTIFERON TB PLUS TB2 MINUS NIL: -0.04 IU/ML

## 2022-04-12 ENCOUNTER — TRANSCRIPTION ENCOUNTER (OUTPATIENT)
Age: 77
End: 2022-04-12

## 2022-04-13 ENCOUNTER — TRANSCRIPTION ENCOUNTER (OUTPATIENT)
Age: 77
End: 2022-04-13

## 2022-04-22 ENCOUNTER — TRANSCRIPTION ENCOUNTER (OUTPATIENT)
Age: 77
End: 2022-04-22

## 2022-04-23 ENCOUNTER — TRANSCRIPTION ENCOUNTER (OUTPATIENT)
Age: 77
End: 2022-04-23

## 2022-04-24 ENCOUNTER — TRANSCRIPTION ENCOUNTER (OUTPATIENT)
Age: 77
End: 2022-04-24

## 2022-04-28 ENCOUNTER — LABORATORY RESULT (OUTPATIENT)
Age: 77
End: 2022-04-28

## 2022-04-28 ENCOUNTER — APPOINTMENT (OUTPATIENT)
Dept: RHEUMATOLOGY | Facility: CLINIC | Age: 77
End: 2022-04-28
Payer: MEDICARE

## 2022-04-28 ENCOUNTER — NON-APPOINTMENT (OUTPATIENT)
Age: 77
End: 2022-04-28

## 2022-04-28 PROCEDURE — 96413 CHEMO IV INFUSION 1 HR: CPT

## 2022-04-28 PROCEDURE — 96374 THER/PROPH/DIAG INJ IV PUSH: CPT | Mod: 59

## 2022-04-28 PROCEDURE — 96415 CHEMO IV INFUSION ADDL HR: CPT

## 2022-04-28 PROCEDURE — 36415 COLL VENOUS BLD VENIPUNCTURE: CPT

## 2022-04-28 PROCEDURE — 96375 TX/PRO/DX INJ NEW DRUG ADDON: CPT

## 2022-04-29 ENCOUNTER — TRANSCRIPTION ENCOUNTER (OUTPATIENT)
Age: 77
End: 2022-04-29

## 2022-05-05 ENCOUNTER — TRANSCRIPTION ENCOUNTER (OUTPATIENT)
Age: 77
End: 2022-05-05

## 2022-05-07 ENCOUNTER — NON-APPOINTMENT (OUTPATIENT)
Age: 77
End: 2022-05-07

## 2022-05-08 ENCOUNTER — NON-APPOINTMENT (OUTPATIENT)
Age: 77
End: 2022-05-08

## 2022-05-11 ENCOUNTER — NON-APPOINTMENT (OUTPATIENT)
Age: 77
End: 2022-05-11

## 2022-05-12 ENCOUNTER — LABORATORY RESULT (OUTPATIENT)
Age: 77
End: 2022-05-12

## 2022-05-12 ENCOUNTER — APPOINTMENT (OUTPATIENT)
Dept: RHEUMATOLOGY | Facility: CLINIC | Age: 77
End: 2022-05-12
Payer: MEDICARE

## 2022-05-12 PROCEDURE — 96413 CHEMO IV INFUSION 1 HR: CPT

## 2022-05-12 PROCEDURE — 36415 COLL VENOUS BLD VENIPUNCTURE: CPT

## 2022-05-12 PROCEDURE — 96374 THER/PROPH/DIAG INJ IV PUSH: CPT | Mod: 59

## 2022-05-12 PROCEDURE — 96415 CHEMO IV INFUSION ADDL HR: CPT

## 2022-06-21 ENCOUNTER — NON-APPOINTMENT (OUTPATIENT)
Age: 77
End: 2022-06-21

## 2022-06-21 ENCOUNTER — APPOINTMENT (OUTPATIENT)
Dept: RHEUMATOLOGY | Facility: CLINIC | Age: 77
End: 2022-06-21
Payer: MEDICARE

## 2022-06-21 VITALS
SYSTOLIC BLOOD PRESSURE: 123 MMHG | OXYGEN SATURATION: 97 % | WEIGHT: 133 LBS | DIASTOLIC BLOOD PRESSURE: 74 MMHG | BODY MASS INDEX: 22.71 KG/M2 | HEART RATE: 79 BPM | TEMPERATURE: 97.7 F | HEIGHT: 64 IN

## 2022-06-21 PROCEDURE — 99214 OFFICE O/P EST MOD 30 MIN: CPT | Mod: 25

## 2022-06-21 PROCEDURE — 96372 THER/PROPH/DIAG INJ SC/IM: CPT

## 2022-06-21 RX ORDER — SYRINGE AND NEEDLE,INSULIN,1ML 31 GX5/16"
31G X 5/16" SYRINGE, EMPTY DISPOSABLE MISCELLANEOUS
Qty: 12 | Refills: 0 | Status: COMPLETED | COMMUNITY
Start: 2019-08-10 | End: 2022-06-21

## 2022-06-21 RX ORDER — DENOSUMAB 60 MG/ML
60 INJECTION SUBCUTANEOUS
Qty: 1 | Refills: 0 | Status: COMPLETED | OUTPATIENT
Start: 2022-06-21

## 2022-06-21 RX ORDER — ESOMEPRAZOLE MAGNESIUM 20 MG/1
TABLET ORAL
Refills: 0 | Status: DISCONTINUED | COMMUNITY
End: 2022-06-21

## 2022-06-21 RX ORDER — SYRINGE AND NEEDLE,INSULIN,1ML 25GX1"
25G X 5/8" SYRINGE, EMPTY DISPOSABLE MISCELLANEOUS
Qty: 12 | Refills: 0 | Status: DISCONTINUED | COMMUNITY
Start: 2019-05-30 | End: 2022-06-21

## 2022-06-21 RX ADMIN — DENOSUMAB 1 MG/ML: 60 INJECTION SUBCUTANEOUS at 00:00

## 2022-06-22 ENCOUNTER — TRANSCRIPTION ENCOUNTER (OUTPATIENT)
Age: 77
End: 2022-06-22

## 2022-06-24 ENCOUNTER — APPOINTMENT (OUTPATIENT)
Dept: DISASTER EMERGENCY | Facility: HOSPITAL | Age: 77
End: 2022-06-24

## 2022-06-24 ENCOUNTER — OUTPATIENT (OUTPATIENT)
Dept: OUTPATIENT SERVICES | Facility: HOSPITAL | Age: 77
LOS: 1 days | End: 2022-06-24

## 2022-06-24 VITALS
DIASTOLIC BLOOD PRESSURE: 69 MMHG | TEMPERATURE: 98 F | RESPIRATION RATE: 16 BRPM | SYSTOLIC BLOOD PRESSURE: 125 MMHG | HEART RATE: 68 BPM | OXYGEN SATURATION: 99 %

## 2022-06-24 VITALS
TEMPERATURE: 98 F | OXYGEN SATURATION: 100 % | WEIGHT: 132.28 LBS | RESPIRATION RATE: 16 BRPM | HEIGHT: 64 IN | DIASTOLIC BLOOD PRESSURE: 84 MMHG | SYSTOLIC BLOOD PRESSURE: 149 MMHG | HEART RATE: 75 BPM

## 2022-06-24 DIAGNOSIS — U07.1 COVID-19: ICD-10-CM

## 2022-06-24 DIAGNOSIS — Z98.49 CATARACT EXTRACTION STATUS, UNSPECIFIED EYE: Chronic | ICD-10-CM

## 2022-06-24 DIAGNOSIS — M19.049 PRIMARY OSTEOARTHRITIS, UNSPECIFIED HAND: ICD-10-CM

## 2022-06-24 RX ORDER — BEBTELOVIMAB 87.5 MG/ML
175 INJECTION, SOLUTION INTRAVENOUS ONCE
Refills: 0 | Status: COMPLETED | OUTPATIENT
Start: 2022-06-24 | End: 2022-06-24

## 2022-06-24 RX ADMIN — BEBTELOVIMAB 175 MILLIGRAM(S): 87.5 INJECTION, SOLUTION INTRAVENOUS at 07:54

## 2022-06-24 NOTE — CHART NOTE - NSCHARTNOTEFT_GEN_A_CORE
CC: Monoclonal Antibody Infusion/COVID 19 Positive  76y Female  with PMH of RA  and recent dx of COVID 19+ who presents today for elective Bebtelovimab. Patient has been screened and was deemed to be a candidate.    Symptoms/ Criteria  Date of Symptom Onset: 6/19/2022  Symptoms: cough, headache, chest tightness  Date of Positive COVID PCR: 6/22/2022  Risk Profile includes:  History of autoimmune disease (Rheumatoid arthritis)    Vaccination Status: Received moderna 2 doses and one booster dose    PMHx:  FH: colon cancer    Infection due to severe acute respiratory syndrome coronavirus 2 (SARS-CoV-2)    H/O: Hypothyroidism (ICD9 V12.2)    GERD (Gastroesophageal Reflux Disease) (ICD9 530.81)    H/O: Osteoarthritis (ICD9 V13.4)    Insomnia (ICD9 780.52)    Recurrent HSV (Herpes Simplex Virus) (ICD9 054.9)    Seasonal Allergies (ICD9 477.9)    Tenosynovitis (ICD9 727.00)    Colon Perforation (ICD9 569.83)    b//l cataracts    CLL (Chronic Lymphoblastic Leukemia) (ICD9 204.10)    CLL (Chronic Lymphocytic Leukemia) (ICD9 204.10)    Acid reflux    Herpes    Rheumatoid arthritis    Trigger thumb, left thumb    Heberden&#x27;s nodes (with arthropathy)    C Section (ICD9 669.70)    S/P Appendectomy (ICD9 V45.89)    S/P Tonsillectomy (ICD9 V45.89)    S/P Colectomy (ICD9 V45.89)    S/P Colonoscopy (ICD9 V45.89)    S/P Dequarvinas    Melanoma (ICD9 172.9)    S/P cataract extraction        Exam/findings:  T(C): 36.8 (06-24-22 @ 08:10), Max: 36.8 (06-24-22 @ 08:10)  HR: 69 (06-24-22 @ 08:10) (69 - 75)  BP: 114/63 (06-24-22 @ 08:10) (114/63 - 149/84)  RR: 16 (06-24-22 @ 08:10) (16 - 16)  SpO2: 98% (06-24-22 @ 08:10) (98% - 100%)    PE:   Appearance: NAD	  HEENT:  NC/AT  Cardiovascular:  No edema  Respiratory: no use of accessory muscles  Gastrointestinal:  non-distended   Skin: warm and dry  Neurologic: Non-focal  Extremities: Normal range of motion    ASSESSMENT:  Pt is COVID positive with mild to moderate symptoms who was referred for elective MAB (Bebtelovimab).    PLAN:  - MAB treatment explained to patient. I have reviewed the Bebtelovimab Emergency Use Authorization (EUA) and I have provided the patient or patient's caregiver with the following information:   1. FDA has authorized emergency use of Bebtelovimab to be administered for the treatment of mild to moderate COVID-19, which is not an FDA-approved biological product.   2. The patient or patient's caregiver has the option to accept or refuse administration of MAB.   3. The significant known and potential risks and benefits of Bebtelovimab and the extent to which such risks and benefits are unknown.  4. Information on available alternative treatments and risks and benefits of those alternatives.  - Patient verbalized understanding of plan and agrees to treatment. All questions answered.  - Consent for MAB obtained.   - 175mg of Bebtelovimab administered as a single intravenous injection over at least 30 seconds.   - Observe patient for one hour post medication administration and then if stable, discharge home with oupatient follow up as planned by PCP.      POST ASSESSMENT:   Patient completed MAB, and monitored x 1 hour post-infusion with no adverse reactions noted, remained hemodynamically stable.  - Patient tolerated infusion well; denies complaints of chest pain/SOB/dizziness/palpitations.   - VSS for discharge home.  - D/C instructions given/ fact sheet included.  - Patient was instructed to self-isolate and use infection control measures (e.g wear mask, isolate, social distance, avoid sharing personal items, clean and disinfect "high touch" surfaces, and frequent handwashing according to the CDC guidelines.   - The patient was informed on what symptoms to be aware of for the next couple of days, and if there are any issues to call the 24/7 clinical call center. Patient was instructed to follow up with primary care provider as needed.    DISCHARGE stable

## 2022-08-08 ENCOUNTER — NON-APPOINTMENT (OUTPATIENT)
Age: 77
End: 2022-08-08

## 2022-08-09 ENCOUNTER — TRANSCRIPTION ENCOUNTER (OUTPATIENT)
Age: 77
End: 2022-08-09

## 2022-09-22 ENCOUNTER — APPOINTMENT (OUTPATIENT)
Dept: RHEUMATOLOGY | Facility: CLINIC | Age: 77
End: 2022-09-22

## 2022-09-22 VITALS
TEMPERATURE: 97.7 F | BODY MASS INDEX: 22.53 KG/M2 | WEIGHT: 132 LBS | HEIGHT: 64 IN | HEART RATE: 87 BPM | DIASTOLIC BLOOD PRESSURE: 83 MMHG | OXYGEN SATURATION: 98 % | SYSTOLIC BLOOD PRESSURE: 131 MMHG

## 2022-09-22 DIAGNOSIS — M50.30 OTHER CERVICAL DISC DEGENERATION, UNSPECIFIED CERVICAL REGION: ICD-10-CM

## 2022-09-22 DIAGNOSIS — M25.511 PAIN IN RIGHT SHOULDER: ICD-10-CM

## 2022-09-22 PROCEDURE — 99215 OFFICE O/P EST HI 40 MIN: CPT

## 2022-09-22 RX ORDER — ONDANSETRON 4 MG/1
4 TABLET ORAL
Qty: 60 | Refills: 0 | Status: ACTIVE | COMMUNITY
Start: 2020-02-21 | End: 1900-01-01

## 2022-09-22 RX ORDER — DOXYCYCLINE HYCLATE 100 MG/1
100 TABLET ORAL
Qty: 60 | Refills: 0 | Status: DISCONTINUED | COMMUNITY
Start: 2020-07-14 | End: 2022-09-22

## 2022-09-23 ENCOUNTER — TRANSCRIPTION ENCOUNTER (OUTPATIENT)
Age: 77
End: 2022-09-23

## 2022-09-23 LAB
25(OH)D3 SERPL-MCNC: 37.4 NG/ML
ALBUMIN SERPL ELPH-MCNC: 4.9 G/DL
ALP BLD-CCNC: 45 U/L
ALT SERPL-CCNC: 20 U/L
ANION GAP SERPL CALC-SCNC: 11 MMOL/L
AST SERPL-CCNC: 20 U/L
BASOPHILS # BLD AUTO: 0.03 K/UL
BASOPHILS NFR BLD AUTO: 0.6 %
BILIRUB SERPL-MCNC: 0.3 MG/DL
BUN SERPL-MCNC: 15 MG/DL
CALCIUM SERPL-MCNC: 9.7 MG/DL
CD16+CD56+ CELLS # BLD: 172 /UL
CD16+CD56+ CELLS NFR BLD: 16 %
CD19 CELLS NFR BLD: 0 /UL
CD3 CELLS # BLD: 890 /UL
CD3 CELLS NFR BLD: 83 %
CD3+CD4+ CELLS # BLD: 523 /UL
CD3+CD4+ CELLS NFR BLD: 50 %
CD3+CD4+ CELLS/CD3+CD8+ CLL SPEC: 1.49 RATIO
CD3+CD8+ CELLS # SPEC: 352 /UL
CD3+CD8+ CELLS NFR BLD: 34 %
CELLS.CD3-CD19+/CELLS IN BLOOD: <1 %
CHLORIDE SERPL-SCNC: 105 MMOL/L
CO2 SERPL-SCNC: 30 MMOL/L
CREAT SERPL-MCNC: 0.7 MG/DL
CRP SERPL-MCNC: <3 MG/L
EGFR: 89 ML/MIN/1.73M2
EOSINOPHIL # BLD AUTO: 0.06 K/UL
EOSINOPHIL NFR BLD AUTO: 1.3 %
ERYTHROCYTE [SEDIMENTATION RATE] IN BLOOD BY WESTERGREN METHOD: 3 MM/HR
GLUCOSE SERPL-MCNC: 117 MG/DL
HCT VFR BLD CALC: 39.2 %
HGB BLD-MCNC: 12.6 G/DL
IGA SER QL IEP: 135 MG/DL
IGG SER QL IEP: 792 MG/DL
IGM SER QL IEP: 39 MG/DL
IMM GRANULOCYTES NFR BLD AUTO: 0.4 %
LYMPHOCYTES # BLD AUTO: 1.17 K/UL
LYMPHOCYTES NFR BLD AUTO: 24.7 %
MAN DIFF?: NORMAL
MCHC RBC-ENTMCNC: 32.1 GM/DL
MCHC RBC-ENTMCNC: 32.6 PG
MCV RBC AUTO: 101.6 FL
MONOCYTES # BLD AUTO: 0.43 K/UL
MONOCYTES NFR BLD AUTO: 9.1 %
NEUTROPHILS # BLD AUTO: 3.02 K/UL
NEUTROPHILS NFR BLD AUTO: 63.9 %
PLATELET # BLD AUTO: 226 K/UL
POTASSIUM SERPL-SCNC: 4.3 MMOL/L
PROT SERPL-MCNC: 6.9 G/DL
RBC # BLD: 3.86 M/UL
RBC # FLD: 14.1 %
SODIUM SERPL-SCNC: 146 MMOL/L
WBC # FLD AUTO: 4.73 K/UL

## 2022-09-30 ENCOUNTER — TRANSCRIPTION ENCOUNTER (OUTPATIENT)
Age: 77
End: 2022-09-30

## 2022-10-03 ENCOUNTER — NON-APPOINTMENT (OUTPATIENT)
Age: 77
End: 2022-10-03

## 2022-10-12 NOTE — HISTORY OF PRESENT ILLNESS
[FreeTextEntry1] : Greater than 10 months status post left middle finger PIP joint cortisone injection #1.\par \par She returns today,\par \par She returns today,\par \par She has a history of RA and his on Methotrexate and Hydroxychloroquine.

## 2022-10-12 NOTE — DISCUSSION/SUMMARY
[FreeTextEntry1] : I had a discussion regarding today's visit, the diagnosis and treatment recommendations and options.  We also discussed changes since the last visit.  At this time, I recommended \par \par The patient has agreed to the above plan of management and has expressed full understanding.  All questions were fully answered to the patient's satisfaction.\par \par My cumulative time spent on today's visit was greater than 30 minutes and included: Preparation for the visit, review of the medical records, review of pertinent diagnostic studies, examination and counseling of the patient on the above diagnosis, treatment plan and prognosis, orders of diagnostic tests, medications and/or appropriate procedures and documentation in the medical records of today's visit.

## 2022-10-12 NOTE — PHYSICAL EXAM
[de-identified] : - Constitutional: This is a female in no obvious distress.  \par - Psych: Patient is alert and oriented to person, place and time.  Patient has a normal mood and affect.\par - Cardiovascular: Normal pulses throughout the upper extremities.  No significant varicosities are noted in the upper extremities. \par - Neuro: Strength and sensation are intact throughout the upper extremities.  Patient has normal coordination.\par - Respiratory:  Patient exhibits no evidence of shortness of breath or difficulty breathing.\par - Skin: No rashes, lesions, or other abnormalities are noted in the upper extremities.\par \par --- \par \par Examination of her left hand demonstrates obvious arthritis of the PIP and DIP joints.  She has swelling most notably at the left middle finger PIP joint with tenderness.  There is also mild tenderness along the middle finger A1 pulley without triggering.  She is quite tender along the middle finger PIP joint and has limitation of flexion and extension.  There is obvious arthritis of the DIP joints of the digits.  She is neurovascular intact distally. [de-identified] : Previous AP, lateral, and oblique radiographs of the left hand and lateral radiographs of the left middle finger demonstrated arthritis throughout the digits, most notably at the left middle finger PIP joint as well as at the DIP joints of the digits.  There is also arthritis at the IP joint of the thumb and the left thumb CMC joint.  At the left middle finger she has arthritis at the PIP joint with a large dorsal bone spur as well as advanced arthritis at the DIP joint.  There is no obvious radiocarpal joint arthritis.

## 2022-10-19 ENCOUNTER — APPOINTMENT (OUTPATIENT)
Dept: ORTHOPEDIC SURGERY | Facility: CLINIC | Age: 77
End: 2022-10-19

## 2022-11-09 ENCOUNTER — NON-APPOINTMENT (OUTPATIENT)
Age: 77
End: 2022-11-09

## 2022-11-11 ENCOUNTER — APPOINTMENT (OUTPATIENT)
Dept: OPHTHALMOLOGY | Facility: CLINIC | Age: 77
End: 2022-11-11

## 2022-11-12 ENCOUNTER — APPOINTMENT (OUTPATIENT)
Age: 77
End: 2022-11-12

## 2022-11-12 ENCOUNTER — OUTPATIENT (OUTPATIENT)
Dept: OUTPATIENT SERVICES | Facility: HOSPITAL | Age: 77
LOS: 1 days | End: 2022-11-12

## 2022-11-12 VITALS
RESPIRATION RATE: 18 BRPM | SYSTOLIC BLOOD PRESSURE: 151 MMHG | HEART RATE: 80 BPM | DIASTOLIC BLOOD PRESSURE: 82 MMHG | TEMPERATURE: 98 F | OXYGEN SATURATION: 97 %

## 2022-11-12 VITALS
RESPIRATION RATE: 18 BRPM | OXYGEN SATURATION: 98 % | SYSTOLIC BLOOD PRESSURE: 152 MMHG | DIASTOLIC BLOOD PRESSURE: 91 MMHG | HEART RATE: 77 BPM | TEMPERATURE: 99 F

## 2022-11-12 DIAGNOSIS — Z98.49 CATARACT EXTRACTION STATUS, UNSPECIFIED EYE: Chronic | ICD-10-CM

## 2022-11-12 RX ORDER — DIPHENHYDRAMINE HCL 50 MG
25 CAPSULE ORAL ONCE
Refills: 0 | Status: DISCONTINUED | OUTPATIENT
Start: 2022-11-12 | End: 2022-11-26

## 2022-11-13 DIAGNOSIS — Z23 ENCOUNTER FOR IMMUNIZATION: ICD-10-CM

## 2022-12-01 ENCOUNTER — TRANSCRIPTION ENCOUNTER (OUTPATIENT)
Age: 77
End: 2022-12-01

## 2022-12-01 DIAGNOSIS — Z13.820 ENCOUNTER FOR SCREENING FOR OSTEOPOROSIS: ICD-10-CM

## 2022-12-15 ENCOUNTER — RX RENEWAL (OUTPATIENT)
Age: 77
End: 2022-12-15

## 2022-12-22 ENCOUNTER — RX RENEWAL (OUTPATIENT)
Age: 77
End: 2022-12-22

## 2022-12-27 ENCOUNTER — APPOINTMENT (OUTPATIENT)
Dept: RHEUMATOLOGY | Facility: CLINIC | Age: 77
End: 2022-12-27

## 2022-12-27 VITALS
HEART RATE: 81 BPM | DIASTOLIC BLOOD PRESSURE: 79 MMHG | HEIGHT: 64 IN | TEMPERATURE: 97.8 F | BODY MASS INDEX: 23.05 KG/M2 | OXYGEN SATURATION: 95 % | WEIGHT: 135 LBS | RESPIRATION RATE: 16 BRPM | SYSTOLIC BLOOD PRESSURE: 137 MMHG

## 2022-12-27 LAB
25(OH)D3 SERPL-MCNC: 41.6 NG/ML
ALBUMIN SERPL ELPH-MCNC: 4.5 G/DL
ALP BLD-CCNC: 58 U/L
ALT SERPL-CCNC: 26 U/L
ANION GAP SERPL CALC-SCNC: 12 MMOL/L
AST SERPL-CCNC: 25 U/L
BASOPHILS # BLD AUTO: 0.04 K/UL
BASOPHILS NFR BLD AUTO: 0.7 %
BILIRUB SERPL-MCNC: 0.3 MG/DL
BUN SERPL-MCNC: 17 MG/DL
CALCIUM SERPL-MCNC: 9.2 MG/DL
CD16+CD56+ CELLS # BLD: 183 CELLS/UL
CD16+CD56+ CELLS NFR BLD: 21 %
CD19 CELLS NFR BLD: 1 CELLS/UL
CD3 CELLS # BLD: 665 CELLS/UL
CD3 CELLS NFR BLD: 78 %
CD3+CD4+ CELLS # BLD: 351 CELLS/UL
CD3+CD4+ CELLS NFR BLD: 42 %
CD3+CD4+ CELLS/CD3+CD8+ CLL SPEC: 1.16 RATIO
CD3+CD8+ CELLS # SPEC: 302 CELLS/UL
CD3+CD8+ CELLS NFR BLD: 36 %
CELLS.CD3-CD19+/CELLS IN BLOOD: <1 %
CHLORIDE SERPL-SCNC: 102 MMOL/L
CO2 SERPL-SCNC: 27 MMOL/L
CREAT SERPL-MCNC: 0.74 MG/DL
CRP SERPL-MCNC: 64 MG/L
EGFR: 83 ML/MIN/1.73M2
EOSINOPHIL # BLD AUTO: 0.14 K/UL
EOSINOPHIL NFR BLD AUTO: 2.3 %
ERYTHROCYTE [SEDIMENTATION RATE] IN BLOOD BY WESTERGREN METHOD: 35 MM/HR
GLUCOSE SERPL-MCNC: 118 MG/DL
HCT VFR BLD CALC: 37.4 %
HGB BLD-MCNC: 11.8 G/DL
IGA SER QL IEP: 128 MG/DL
IGG SER QL IEP: 796 MG/DL
IGM SER QL IEP: 37 MG/DL
IMM GRANULOCYTES NFR BLD AUTO: 0.2 %
LYMPHOCYTES # BLD AUTO: 0.88 K/UL
LYMPHOCYTES NFR BLD AUTO: 14.6 %
MAN DIFF?: NORMAL
MCHC RBC-ENTMCNC: 31.6 GM/DL
MCHC RBC-ENTMCNC: 32.6 PG
MCV RBC AUTO: 103.3 FL
MONOCYTES # BLD AUTO: 0.71 K/UL
MONOCYTES NFR BLD AUTO: 11.8 %
NEUTROPHILS # BLD AUTO: 4.24 K/UL
NEUTROPHILS NFR BLD AUTO: 70.4 %
PLATELET # BLD AUTO: 250 K/UL
POTASSIUM SERPL-SCNC: 4.3 MMOL/L
PROT SERPL-MCNC: 6.6 G/DL
RBC # BLD: 3.62 M/UL
RBC # FLD: 14.1 %
SODIUM SERPL-SCNC: 141 MMOL/L
WBC # FLD AUTO: 6.02 K/UL

## 2022-12-27 RX ORDER — DOXYCYCLINE 50 MG/1
50 TABLET, FILM COATED ORAL
Qty: 60 | Refills: 0 | Status: COMPLETED | COMMUNITY
Start: 2022-10-07

## 2022-12-27 RX ORDER — DENOSUMAB 60 MG/ML
60 INJECTION SUBCUTANEOUS
Qty: 1 | Refills: 0 | Status: COMPLETED | OUTPATIENT
Start: 2022-12-27

## 2022-12-27 RX ORDER — PREDNISONE 2.5 MG/1
2.5 TABLET ORAL
Qty: 40 | Refills: 0 | Status: DISCONTINUED | COMMUNITY
Start: 2022-09-22 | End: 2022-12-27

## 2022-12-27 RX ORDER — PROMETHAZINE HYDROCHLORIDE AND DEXTROMETHORPHAN HYDROBROMIDE ORAL SOLUTION 15; 6.25 MG/5ML; MG/5ML
6.25-15 SOLUTION ORAL
Qty: 120 | Refills: 0 | Status: COMPLETED | COMMUNITY
Start: 2022-07-08

## 2022-12-27 RX ORDER — CEFUROXIME AXETIL 500 MG/1
500 TABLET ORAL
Qty: 14 | Refills: 0 | Status: COMPLETED | COMMUNITY
Start: 2022-11-11

## 2022-12-27 RX ORDER — LOTEPREDNOL ETABONATE 5 MG/ML
0.5 SUSPENSION/ DROPS OPHTHALMIC
Qty: 5 | Refills: 0 | Status: COMPLETED | COMMUNITY
Start: 2022-07-08

## 2022-12-27 RX ORDER — FLUCONAZOLE 100 MG/1
100 TABLET ORAL
Qty: 10 | Refills: 0 | Status: COMPLETED | COMMUNITY
Start: 2022-07-08

## 2022-12-27 RX ORDER — PREDNISONE 10 MG/1
10 TABLET ORAL
Qty: 30 | Refills: 0 | Status: COMPLETED | COMMUNITY
Start: 2022-11-14

## 2022-12-27 NOTE — REASON FOR VISIT
[FreeTextEntry1] : RA follow up,pancreatitis , LGL, intraductal papillary mucinous neoplasm, hypothyroid

## 2022-12-27 NOTE — HISTORY OF PRESENT ILLNESS
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 \par she is still having abdominal pain and burping but those are not thought to be attributed to the neuroendorcrine tumor.\par she is on creon and it is helping with her burping partially. \par on 11-6-22 she had a burst blood vessel around the left eye and it has resolved - has seen pmd. \par last week had swelling of the left 3rd pip and had injection with Dr. Parker and it is a lot better. \par \par \par Significant background: \par + CCP\par + history of CLL/LGL  - normal WBC and not on any therapy .  monitored at [facility]\par   - had bone marrow biopsy and PET scan and blood work. \par + family history of RA (Padmaja Mckeon)\par allergic reaction to iron infusion\par blepharitis\par \par \par \par  [Erosions] : no erosions [Joint Swelling] : no joint swelling [Rash] : no rash [Shortness of Breath] : no shortness of breath [Ocular Symptoms] : no ocular symptoms [Dysphonia] : no dysphonia [Morning Stiffness] : no morning stiffness [Chest Pain] : no chest pain [Fatigue] : no fatigue [TextBox_40] : MTX 8-2017 -> currentlyon mtx 3 pills a week  [TextBox_42] : Rituxan (5-2018, 6-2018), Xeljanz (1-2020 - 9-2020), Rituxan (7-29-21 and 8-16-21) (5-2022) [TextBox_70] : had rituxan in 5-2022\par joint pain was okay until beginning of september 2022 and then developed b/l knees, left arm pain and right leg pain.  Pain comes and goes from these sites.  pain is the worst on the left shoulder and neck and she has had that before treated with Physcial therapy\par she has increasing pain in the bilateral IP's in both hands and she feels "aware of the fingers' and has stiffness all day.  the right 3rd finger is excruciating pain and somewhat better after injection. \par \par on mtx 3 pills a week and takes folic acid daily and hcq  200mg daily \par follows with GI \par

## 2022-12-27 NOTE — DATA REVIEWED
[FreeTextEntry1] : Laboratory and radiology studies that were personally reviewed at today's visit are summarized below and above:  :\par Ortho note  & 12-8-21  right 3rd pip injection\par 7-8-2021:  HAnd US with OA and with scattered joint effusions without hyperemia (doppler negative) . \par \par 5-:  DeXA - Osteoporosis - based on spine of -3.1 and fem tneck =-2.3 and total hip = -1.3, \par 5-:   X-ray:  hand/wrist/foot/ankle OA changes and no ra damage. 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  Multilevel degenerative disease manifest by varying degrees of disc space narrowing with disc margin osteophyte formation and posterior facet arthrosis.  Slight C4 and C5 anterolisthesis apparent on flexion decreases on extension indicating degree of  dynamic instability at this level. Remaining vertebral body alignment maintained and no evidence \par for atlantoaxial dynamic instability. \par Prevertebral soft tissues and predental interval within normal limits. \par Atlantoaxial and posterior facet alignment maintained. \par Intact odontoid. Generalized osteopenia otherwise no discrete lytic or blastic lesions. \par Clear visualized lung apices and midline normal caliber trachea.\par \par 6-13-19:  CD19 - 2% \par 9-12-19:  vitamin d 42.7\par \par \par \par april 2017 labs reviewed \par ccp of 82\par \par EXAM: MR SHOULDER RT \par \par \par PROCEDURE DATE: 12/02/2017 \par \par \par \par INTERPRETATION: MR SHOULDER RT dated 12/2/2017 2:00 PM \par \par INDICATION: Right shoulder pain. Difficulty moving arm. \par \par TECHNIQUE: Multi-sequential, multiplanar MRI imaging of the shoulder was \par performed per standard protocol. \par \par COMPARISON: None available. \par \par FINDINGS: There is moderate productive change at the acromioclavicular joint \par mild lateral downsloping of the acromion. A moderate amount of fluid is seen \par in the subacromial/subdeltoid bursa with mild synovitis. \par \par There is mild tendinosis within the infraspinatus tendon. No full-thickness \par tear or tendon retraction is appreciated. The subscapularis, teres minor, \par and supraspinatus tendons are preserved. The biceps tendon is within the \par groove and normal in appearance. There is no muscle atrophy. \par \par There is no glenohumeral joint effusion. Motion artifact mildly degrades \par detailed evaluation of the glenoid labrum. Given this limitation, no \par displaced labral tear is seen. Preservation of the glenohumeral cartilage is \par noted. There is no fracture. \par \par There is no soft tissue swelling. No soft tissue mass is identified. \par \par \par IMPRESSION: \par Moderate subacromial/subdeltoid bursitis with synovitis. \par Moderate acromioclavicular DJD. \par \par

## 2022-12-27 NOTE — PHYSICAL EXAM
[General Appearance - Alert] : alert [General Appearance - In No Acute Distress] : in no acute distress [General Appearance - Well Nourished] : well nourished [General Appearance - Well Developed] : well developed [Sclera] : the sclera and conjunctiva were normal [Extraocular Movements] : extraocular movements were intact [Outer Ear] : the ears and nose were normal in appearance [Nasal Cavity] : the nasal mucosa and septum were normal [Neck Appearance] : the appearance of the neck was normal [Skin Color & Pigmentation] : normal skin color and pigmentation [Skin Turgor] : normal skin turgor [] : no rash [Cranial Nerves] : cranial nerves 2-12 were intact [Oriented To Time, Place, And Person] : oriented to person, place, and time [FreeTextEntry1] : OA changes, RA activity with s/t joints  - see cdai  12 t/14s ptga = 5, pga = 5 total cdai = 36

## 2022-12-27 NOTE — ASSESSMENT
[FreeTextEntry1] : 77   year old female with + CCP RA, h/o LGL on MTX at 8 pills per week since 11/2017 (mtx started initially in 8-2017 and currently on mtx 3 pills a week PO until last week when she increased to 4 pills a week \par CUrrent medications: \par mtx 4 pills a week\par folic acid 2 mg daily \par hcq 200mg daily \par RITUXAN in May 2022 \par \par # RA -mild/moderate disease activity based on clinical impression.  \par Patient with combination of OA and RA which affects PGA\par continue mtx but increased from 4  pills a week \par continue folic acid and HCQ \par Will check laboratory tests to look for markers of disease activity and also to assess for medication toxicity.\par has allergies to sulfa and Gi intolerance to multiple NSAIDs. \par * xrays from 5-2021 reviewed and will check again in 5-2023. \par => redose rituxan (needs zyrtec for 3 days before and 3 days after \par => add prednsioen 2.5mg bid until rtx.  dec to 1 pill a day after 1st rtx and then d/c 2 weeks after 2nd rtx\par \par # bone health - osteoporosis\par DEXA 2/22/19 reviewed - spine T =-3.7 and hip = -1.8 \par fracture in the past. \par new compression fracture in June 2019\par can't tolerate bisphosphates due to passing out and dizziness \par was on Forteo for 2 year about 10 years ago. \par Prolia # 1 June 2019 Prolia # 2 Dec 2019 and 3rd in June 2020 and 4th given dec 2020 and 5th given june 2021 \par Prolia # 6 (12-21-21)\par Prolia # 7 (6-2022)\par Prolia # & (12-)\par Check vitamin d \par NIH handout on calcium  at prior visit. \par \par Pancreatic Lesion \par concern for neuroendocrine tumor \par follow up with onc about next pet scan \par \par HCM:  \par PNA vaccines with PMD in the past 4-5 years. \par covid vaccine completed and got 3rd vaccine in \par took flu shot this fall \par \par Risks of, benefits of and alternatives to this treatment plan discussed with patient and patient expressed understanding.\par \par More than 50% of the encounter was spent counselling the patient on differential, workup, disease course, and treatment/management.   Education was provided to the patient during this encounter. All questions and concerns were answered and addressed in detail. The patient verbalized understanding and agreed to plan\par \par Patient has been instructed to call for an earlier appointment if new symptoms develop \par Patient has been instructed to make a follow up appointment  3 months \par \par

## 2023-01-18 RX ORDER — RITUXIMAB 10 MG/ML
500 INJECTION, SOLUTION INTRAVENOUS
Qty: 0 | Refills: 0 | Status: COMPLETED | OUTPATIENT
Start: 2023-01-17 | End: 1900-01-01

## 2023-01-18 RX ORDER — CETIRIZINE HYDROCHLORIDE 10 MG/1
10 TABLET, FILM COATED ORAL
Qty: 0 | Refills: 0 | Status: COMPLETED | OUTPATIENT
Start: 2023-01-17 | End: 1900-01-01

## 2023-01-18 RX ORDER — METHYLPREDNISOLONE 125 MG/2ML
125 INJECTION, POWDER, LYOPHILIZED, FOR SOLUTION INTRAMUSCULAR; INTRAVENOUS
Qty: 0 | Refills: 0 | Status: COMPLETED | OUTPATIENT
Start: 2023-01-17 | End: 1900-01-01

## 2023-01-18 RX ORDER — ACETAMINOPHEN 325 MG/1
325 TABLET ORAL
Qty: 0 | Refills: 0 | Status: COMPLETED | OUTPATIENT
Start: 2023-01-17 | End: 1900-01-01

## 2023-02-02 ENCOUNTER — TRANSCRIPTION ENCOUNTER (OUTPATIENT)
Age: 78
End: 2023-02-02

## 2023-02-09 ENCOUNTER — LABORATORY RESULT (OUTPATIENT)
Age: 78
End: 2023-02-09

## 2023-02-09 ENCOUNTER — APPOINTMENT (OUTPATIENT)
Dept: RHEUMATOLOGY | Facility: CLINIC | Age: 78
End: 2023-02-09
Payer: MEDICARE

## 2023-02-09 VITALS
RESPIRATION RATE: 16 BRPM | OXYGEN SATURATION: 97 % | SYSTOLIC BLOOD PRESSURE: 138 MMHG | HEART RATE: 74 BPM | DIASTOLIC BLOOD PRESSURE: 66 MMHG | TEMPERATURE: 97.1 F

## 2023-02-09 VITALS
RESPIRATION RATE: 16 BRPM | DIASTOLIC BLOOD PRESSURE: 75 MMHG | OXYGEN SATURATION: 96 % | SYSTOLIC BLOOD PRESSURE: 139 MMHG | HEART RATE: 83 BPM | TEMPERATURE: 97.8 F

## 2023-02-09 VITALS
TEMPERATURE: 97.6 F | SYSTOLIC BLOOD PRESSURE: 154 MMHG | DIASTOLIC BLOOD PRESSURE: 80 MMHG | RESPIRATION RATE: 16 BRPM | OXYGEN SATURATION: 97 % | HEART RATE: 70 BPM

## 2023-02-09 VITALS
SYSTOLIC BLOOD PRESSURE: 125 MMHG | RESPIRATION RATE: 16 BRPM | TEMPERATURE: 98 F | OXYGEN SATURATION: 95 % | HEART RATE: 85 BPM | DIASTOLIC BLOOD PRESSURE: 67 MMHG

## 2023-02-09 VITALS
TEMPERATURE: 97.9 F | SYSTOLIC BLOOD PRESSURE: 132 MMHG | HEART RATE: 82 BPM | OXYGEN SATURATION: 96 % | DIASTOLIC BLOOD PRESSURE: 71 MMHG | RESPIRATION RATE: 16 BRPM

## 2023-02-09 VITALS
RESPIRATION RATE: 16 BRPM | OXYGEN SATURATION: 97 % | HEART RATE: 75 BPM | DIASTOLIC BLOOD PRESSURE: 79 MMHG | TEMPERATURE: 97.8 F | SYSTOLIC BLOOD PRESSURE: 153 MMHG

## 2023-02-09 PROCEDURE — 96415 CHEMO IV INFUSION ADDL HR: CPT

## 2023-02-09 PROCEDURE — 36415 COLL VENOUS BLD VENIPUNCTURE: CPT

## 2023-02-09 PROCEDURE — 96413 CHEMO IV INFUSION 1 HR: CPT

## 2023-02-09 PROCEDURE — 96374 THER/PROPH/DIAG INJ IV PUSH: CPT | Mod: 59

## 2023-02-09 RX ORDER — CETIRIZINE HYDROCHLORIDE 10 MG/1
10 TABLET, FILM COATED ORAL
Qty: 0 | Refills: 0 | Status: COMPLETED | OUTPATIENT
Start: 2023-02-03

## 2023-02-09 RX ORDER — METHYLPREDNISOLONE 125 MG/2ML
125 INJECTION, POWDER, LYOPHILIZED, FOR SOLUTION INTRAMUSCULAR; INTRAVENOUS
Qty: 0 | Refills: 0 | Status: COMPLETED | OUTPATIENT
Start: 2023-02-03

## 2023-02-09 RX ORDER — RITUXIMAB 10 MG/ML
500 INJECTION, SOLUTION INTRAVENOUS
Qty: 0 | Refills: 0 | Status: COMPLETED | OUTPATIENT
Start: 2023-02-03

## 2023-02-09 RX ORDER — ACETAMINOPHEN 325 MG/1
325 TABLET ORAL
Qty: 0 | Refills: 0 | Status: COMPLETED | OUTPATIENT
Start: 2023-02-03

## 2023-02-09 NOTE — HISTORY OF PRESENT ILLNESS
[3] : 3 [Denies] : Denies [No] : No [N/A] : N/A [Yes] : Yes [22g] : 22g [Medication Name: ___] : Medication Name: [unfilled] [Start Time: ___] : Medication Start Time: [unfilled] [End Time: ___] : Medication End Time: [unfilled] [IV discontinued. Intact. No signs or symptoms of IV complications noted. Time: ___] : IV discontinued. Intact. No signs or symptoms of IV complications noted. Time: [unfilled] [Patient  instructed to seek medical attention with signs and symptoms of adverse effects] : Patient  instructed to seek medical attention with signs and symptoms of adverse effects [Patient left unit in no acute distress] : Patient left unit in no acute distress [Medications administered as ordered and tolerated well.] : Medications administered as ordered and tolerated well. [Blood drawn at time of visit] : Blood drawn at time of visit [de-identified] : left shoulder, wrists, hands and digits [de-identified] : dose 1:2 [de-identified] : left arm median cubital vein  [de-identified] : labs drawn as prescribed.  [de-identified] : Patient presents for scheduled Rituxan infusion, day 0, ambulatory in [facility]. Today, patient reports mild  joints pain as rated above. Reports swelling and stiffness to hands and digits. Reports moderate daily fatigue. Patient admits taking Zyrtec x 3 days prior to infusion as advised by Dr. Virgen. No other concerns verbalized. Teaching provided and consent discussed as with Dr. Virgen. Patient premedicated as prescribed, infusion titrated as per protocol and tolerated well. VS monitored/recorded/ stable. Discharged instructions provided and patient left unit ambulatory in [facility].

## 2023-02-10 ENCOUNTER — TRANSCRIPTION ENCOUNTER (OUTPATIENT)
Age: 78
End: 2023-02-10

## 2023-02-23 ENCOUNTER — APPOINTMENT (OUTPATIENT)
Dept: RHEUMATOLOGY | Facility: CLINIC | Age: 78
End: 2023-02-23
Payer: MEDICARE

## 2023-02-23 ENCOUNTER — LABORATORY RESULT (OUTPATIENT)
Age: 78
End: 2023-02-23

## 2023-02-23 VITALS
HEART RATE: 78 BPM | RESPIRATION RATE: 16 BRPM | SYSTOLIC BLOOD PRESSURE: 137 MMHG | DIASTOLIC BLOOD PRESSURE: 81 MMHG | OXYGEN SATURATION: 95 % | TEMPERATURE: 97.4 F

## 2023-02-23 VITALS
SYSTOLIC BLOOD PRESSURE: 148 MMHG | HEART RATE: 76 BPM | OXYGEN SATURATION: 95 % | RESPIRATION RATE: 16 BRPM | TEMPERATURE: 97.9 F | DIASTOLIC BLOOD PRESSURE: 83 MMHG

## 2023-02-23 VITALS
DIASTOLIC BLOOD PRESSURE: 89 MMHG | RESPIRATION RATE: 18 BRPM | OXYGEN SATURATION: 95 % | HEART RATE: 81 BPM | SYSTOLIC BLOOD PRESSURE: 167 MMHG | TEMPERATURE: 98 F

## 2023-02-23 VITALS — OXYGEN SATURATION: 95 % | HEART RATE: 77 BPM | SYSTOLIC BLOOD PRESSURE: 160 MMHG | DIASTOLIC BLOOD PRESSURE: 85 MMHG

## 2023-02-23 DIAGNOSIS — L71.9 ROSACEA, UNSPECIFIED: ICD-10-CM

## 2023-02-23 PROCEDURE — 96374 THER/PROPH/DIAG INJ IV PUSH: CPT | Mod: 59

## 2023-02-23 PROCEDURE — 96413 CHEMO IV INFUSION 1 HR: CPT

## 2023-02-23 PROCEDURE — 36415 COLL VENOUS BLD VENIPUNCTURE: CPT

## 2023-02-23 PROCEDURE — 96415 CHEMO IV INFUSION ADDL HR: CPT

## 2023-02-23 RX ORDER — ACETAMINOPHEN 325 MG/1
325 TABLET ORAL
Qty: 0 | Refills: 0 | Status: COMPLETED | OUTPATIENT
Start: 2023-02-17

## 2023-02-23 RX ORDER — RITUXIMAB 10 MG/ML
500 INJECTION, SOLUTION INTRAVENOUS
Qty: 0 | Refills: 0 | Status: COMPLETED | OUTPATIENT
Start: 2023-02-17

## 2023-02-23 RX ORDER — CETIRIZINE HYDROCHLORIDE 10 MG/1
10 TABLET, FILM COATED ORAL
Qty: 0 | Refills: 0 | Status: COMPLETED | OUTPATIENT
Start: 2023-02-17

## 2023-02-23 RX ORDER — METHYLPREDNISOLONE 125 MG/2ML
125 INJECTION, POWDER, LYOPHILIZED, FOR SOLUTION INTRAMUSCULAR; INTRAVENOUS
Qty: 0 | Refills: 0 | Status: COMPLETED | OUTPATIENT
Start: 2023-02-17

## 2023-02-23 NOTE — HISTORY OF PRESENT ILLNESS
[3] : 3 [N/A] : N/A [Denies] : Denies [No] : No [Yes] : Yes [de-identified] : left shoulder, wrists, hands and digits [Left upper extremity] : Left upper extremity [22g] : 22g [Medication Name: ___] : Medication Name: [unfilled] [Start Time: ___] : Medication Start Time: [unfilled] [End Time: ___] : Medication End Time: [unfilled] [IV discontinued. Intact. No signs or symptoms of IV complications noted. Time: ___] : IV discontinued. Intact. No signs or symptoms of IV complications noted. Time: [unfilled] [Patient  instructed to seek medical attention with signs and symptoms of adverse effects] : Patient  instructed to seek medical attention with signs and symptoms of adverse effects [Patient left unit in no acute distress] : Patient left unit in no acute distress [Blood drawn at time of visit] : Blood drawn at time of visit [de-identified] : forearm [de-identified] : labs drawn as prescribed.  [de-identified] : Patient presents for scheduled Rituxan infusion, day 15, ambulatory in [facility]. Today, patient reports mild  joints pain as rated above. Reports swelling and stiffness to hands and digits. Reports moderate daily fatigue. Patient admits taking Zyrtec x 3 days prior to infusion as advised by Dr. Virgen. No other concerns verbalized. Patient premedicated as prescribed, titrated infusion started at 10:41am. At 11:15am patient noted coughing, with redness to face. Patient reporting trouble breathing and sensation of throat "closing up". Patient denies any other s/s. Infusion paused, VSS recorded, Dr. Swanson at patient's side. Patient insisting that this is "normal" for her, normally uses rescue inhaler when occurs. Patient monitored for resolve of symptoms, decision was made not to resume infusion at the time. Patient advised to scheduled follow up visit with MD Virgen to discuss further treatments. Patient agreeable for same addition discharge instructions provided and patient left unit ambulatory in [facility].

## 2023-02-26 ENCOUNTER — TRANSCRIPTION ENCOUNTER (OUTPATIENT)
Age: 78
End: 2023-02-26

## 2023-02-26 ENCOUNTER — NON-APPOINTMENT (OUTPATIENT)
Age: 78
End: 2023-02-26

## 2023-03-18 ENCOUNTER — RX RENEWAL (OUTPATIENT)
Age: 78
End: 2023-03-18

## 2023-03-22 ENCOUNTER — APPOINTMENT (OUTPATIENT)
Dept: RHEUMATOLOGY | Facility: CLINIC | Age: 78
End: 2023-03-22
Payer: MEDICARE

## 2023-03-22 VITALS
HEIGHT: 64 IN | SYSTOLIC BLOOD PRESSURE: 129 MMHG | BODY MASS INDEX: 23.05 KG/M2 | DIASTOLIC BLOOD PRESSURE: 83 MMHG | TEMPERATURE: 97.1 F | HEART RATE: 84 BPM | OXYGEN SATURATION: 98 % | RESPIRATION RATE: 16 BRPM | WEIGHT: 135 LBS

## 2023-03-22 PROBLEM — L71.9 ROSACEA: Status: ACTIVE | Noted: 2023-03-22

## 2023-03-22 PROCEDURE — 99215 OFFICE O/P EST HI 40 MIN: CPT

## 2023-03-22 RX ORDER — TOBRAMYCIN AND DEXAMETHASONE 3; 1 MG/ML; MG/ML
0.3-0.1 SUSPENSION/ DROPS OPHTHALMIC
Qty: 5 | Refills: 0 | Status: DISCONTINUED | COMMUNITY
Start: 2020-08-08 | End: 2023-03-22

## 2023-03-22 RX ORDER — ERYTHROMYCIN 5 MG/G
5 OINTMENT OPHTHALMIC
Qty: 4 | Refills: 0 | Status: DISCONTINUED | COMMUNITY
Start: 2020-02-08 | End: 2023-03-22

## 2023-03-22 RX ORDER — NEOMYCIN AND POLYMYXIN B SULFATES AND DEXAMETHASONE 3.5; 10000; 1 MG/G; [IU]/G; MG/G
3.5-10000-0.1 OINTMENT OPHTHALMIC
Qty: 4 | Refills: 0 | Status: DISCONTINUED | COMMUNITY
Start: 2022-05-20 | End: 2023-03-22

## 2023-03-22 RX ORDER — MONTELUKAST 10 MG/1
10 TABLET, FILM COATED ORAL
Refills: 0 | Status: DISCONTINUED | COMMUNITY
Start: 2018-10-19 | End: 2023-03-22

## 2023-03-22 RX ORDER — MUPIROCIN 20 MG/G
2 OINTMENT TOPICAL
Qty: 22 | Refills: 0 | Status: DISCONTINUED | COMMUNITY
Start: 2022-04-22 | End: 2023-03-22

## 2023-03-22 RX ORDER — PREDNISONE 2.5 MG/1
2.5 TABLET ORAL
Qty: 180 | Refills: 0 | Status: DISCONTINUED | COMMUNITY
Start: 2022-12-27 | End: 2023-03-22

## 2023-03-22 RX ORDER — COVID-19 ANTIGEN TEST
KIT MISCELLANEOUS
Qty: 8 | Refills: 0 | Status: DISCONTINUED | COMMUNITY
Start: 2022-05-20 | End: 2023-03-22

## 2023-03-22 RX ORDER — DOXYCYCLINE HYCLATE 20 MG/1
20 TABLET ORAL
Refills: 0 | Status: ACTIVE | COMMUNITY
Start: 2023-03-22

## 2023-03-22 RX ORDER — ONDANSETRON 4 MG/1
4 TABLET, ORALLY DISINTEGRATING ORAL DAILY
Qty: 24 | Refills: 0 | Status: DISCONTINUED | COMMUNITY
Start: 2021-08-03 | End: 2023-03-22

## 2023-03-22 NOTE — ASSESSMENT
[FreeTextEntry1] : 77   year old female with + CCP RA, h/o LGL on MTX at 8 pills per week since 11/2017 (mtx started initially in 8-2017 and currently on mtx 4 pills a week PO\par CUrrent medications: \par mtx 4 pills a week\par folic acid 2 mg daily \par hcq 200mg daily \par RITUXAN in Feb 2023 with infusion reaction \par \par # RA -mild/moderate disease activity based on clinical impression.  \par Patient with combination of OA and RA which affects PGA\par =>continue mtx but increase from 4 pills a week to 3/3 (first 3 doses as 15mg SQ weekly and then change to 3/3 pills \par => continue folic acid and HCQ \par =>Will check laboratory tests to look for markers of disease activity and also to assess for medication toxicity.\par has allergies to sulfa and Gi intolerance to multiple NSAIDs. \par * xrays from 5-2021 reviewed and will check again in 5-2023. \par => cannot redose rituxan - consider gazyva.  Will follow up b cell subsets and also talk with anum spencer) at [facility]. \par => blood work after inc mtx\par \par # bone health - osteoporosis\par DEXA 2/22/19 reviewed - spine T =-3.7 and hip = -1.8 \par fracture in the past. \par new compression fracture in June 2019\par can't tolerate bisphosphates due to passing out and dizziness \par was on Forteo for 2 year about 10 years ago. \par Prolia # 1 June 2019 Prolia # 2 Dec 2019 and 3rd in June 2020 and 4th given dec 2020 and 5th given june 2021 \par Prolia # 6 (12-21-21)\par Prolia # 7 (6-2022)\par Prolia # & (12-)\par Check vitamin d \par NIH handout on calcium  at prior visit. \par \par Pancreatic Lesion \par concern for neuroendocrine tumor \par follow up with onc about next pet scan \par \par HCM:  \par PNA vaccines with PMD in the past 4-5 years. \par covid vaccine completed and got 3rd vaccine in \par flu shot fall 2022\par \par Risks of, benefits of and alternatives to this treatment plan discussed with patient and patient expressed understanding.\par \par More than 50% of the encounter was spent counselling the patient on differential, workup, disease course, and treatment/management.   Education was provided to the patient during this encounter. All questions and concerns were answered and addressed in detail. The patient verbalized understanding and agreed to plan\par \par Patient has been instructed to call for an earlier appointment if new symptoms develop \par Patient has been instructed to make a follow up appointment  3 months \par \par

## 2023-03-22 NOTE — HISTORY OF PRESENT ILLNESS
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 \par abdominal symptoms including bloating and stomach pain are a lot better. \par she is on creon and it is helping with her burping partially. \par \par earlier this week had had swelling of the left 3rd pip and 3rd mcp  and had injection with Dr. Parker and it is a lot better. \par has some pain in the left shoulder \par has some discomfort in the PIP of the right hand also. \par left 4th finger is also a little painful \par \par issues with blepharitis and rosacea and currently on doxycycline \par + Dry eyes with blepharitis \par \par Significant background: \par + CCP\par + history of CLL/LGL  - normal WBC and not on any therapy .  monitored at [facility]\par   - had bone marrow biopsy and PET scan and blood work. \par + family history of RA (Padmaja Mckeon)\par allergic reaction to iron infusion\par blepharitis\par \par \par \par  [Erosions] : no erosions [Rash] : no rash [Shortness of Breath] : no shortness of breath [Ocular Symptoms] : no ocular symptoms [Dysphonia] : no dysphonia [Morning Stiffness] : no morning stiffness [Chest Pain] : no chest pain [Fatigue] : no fatigue [TextBox_40] : MTX 8-2017 -> currentlyon mtx 3 pills a week  [TextBox_42] : Rituxan (5-2018, 6-2018), Xeljanz (1-2020 - 9-2020), Rituxan (7-29-21 and 8-16-21) (5-2022) [TextBox_44] : Rituxan 2-2023 (did not tolerate 2nd infusion)

## 2023-03-22 NOTE — PHYSICAL EXAM
[General Appearance - Alert] : alert [General Appearance - In No Acute Distress] : in no acute distress [General Appearance - Well Nourished] : well nourished [General Appearance - Well Developed] : well developed [Sclera] : the sclera and conjunctiva were normal [Extraocular Movements] : extraocular movements were intact [Outer Ear] : the ears and nose were normal in appearance [Nasal Cavity] : the nasal mucosa and septum were normal [Neck Appearance] : the appearance of the neck was normal [Skin Color & Pigmentation] : normal skin color and pigmentation [Skin Turgor] : normal skin turgor [] : no rash [Cranial Nerves] : cranial nerves 2-12 were intact [Oriented To Time, Place, And Person] : oriented to person, place, and time [FreeTextEntry1] : OA changes, RA activity with s/t joints MCP And PIP bilaterally  (left 4/5 mcp and pip spared)

## 2023-03-27 ENCOUNTER — TRANSCRIPTION ENCOUNTER (OUTPATIENT)
Age: 78
End: 2023-03-27

## 2023-03-27 LAB
ALBUMIN SERPL ELPH-MCNC: 4.8 G/DL
ALP BLD-CCNC: 42 U/L
ALT SERPL-CCNC: 24 U/L
ANION GAP SERPL CALC-SCNC: 14 MMOL/L
AST SERPL-CCNC: 27 U/L
BASOPHILS # BLD AUTO: 0.03 K/UL
BASOPHILS NFR BLD AUTO: 0.5 %
BILIRUB SERPL-MCNC: 0.5 MG/DL
BUN SERPL-MCNC: 22 MG/DL
CALCIUM SERPL-MCNC: 9.8 MG/DL
CD16+CD56+ CELLS # BLD: 160 CELLS/UL
CD16+CD56+ CELLS NFR BLD: 14 %
CD19 CELLS NFR BLD: 1 CELLS/UL
CD3 CELLS # BLD: 963 CELLS/UL
CD3 CELLS NFR BLD: 83 %
CD3+CD4+ CELLS # BLD: 589 CELLS/UL
CD3+CD4+ CELLS NFR BLD: 50 %
CD3+CD4+ CELLS/CD3+CD8+ CLL SPEC: 1.56 RATIO
CD3+CD8+ CELLS # SPEC: 377 CELLS/UL
CD3+CD8+ CELLS NFR BLD: 32 %
CELLS.CD3-CD19+/CELLS IN BLOOD: <1 %
CHLORIDE SERPL-SCNC: 105 MMOL/L
CHOLEST SERPL-MCNC: 203 MG/DL
CO2 SERPL-SCNC: 26 MMOL/L
CREAT SERPL-MCNC: 0.88 MG/DL
CRP SERPL-MCNC: <3 MG/L
EGFR: 68 ML/MIN/1.73M2
EOSINOPHIL # BLD AUTO: 0.01 K/UL
EOSINOPHIL NFR BLD AUTO: 0.2 %
ERYTHROCYTE [SEDIMENTATION RATE] IN BLOOD BY WESTERGREN METHOD: 10 MM/HR
GLUCOSE SERPL-MCNC: 110 MG/DL
HAV IGM SER QL: NONREACTIVE
HBV CORE IGG+IGM SER QL: NONREACTIVE
HBV CORE IGM SER QL: NONREACTIVE
HBV SURFACE AG SER QL: NONREACTIVE
HCT VFR BLD CALC: 39.4 %
HCV AB SER QL: NONREACTIVE
HCV S/CO RATIO: 0.1 S/CO
HDLC SERPL-MCNC: 80 MG/DL
HGB BLD-MCNC: 12.2 G/DL
IGA SER QL IEP: 118 MG/DL
IGG SER QL IEP: 812 MG/DL
IGM SER QL IEP: 37 MG/DL
IMM GRANULOCYTES NFR BLD AUTO: 0.2 %
LDLC SERPL CALC-MCNC: 107 MG/DL
LYMPHOCYTES # BLD AUTO: 1.2 K/UL
LYMPHOCYTES NFR BLD AUTO: 19 %
M TB IFN-G BLD-IMP: NEGATIVE
MAN DIFF?: NORMAL
MCHC RBC-ENTMCNC: 31 GM/DL
MCHC RBC-ENTMCNC: 31.2 PG
MCV RBC AUTO: 100.8 FL
MONOCYTES # BLD AUTO: 0.75 K/UL
MONOCYTES NFR BLD AUTO: 11.9 %
NEUTROPHILS # BLD AUTO: 4.32 K/UL
NEUTROPHILS NFR BLD AUTO: 68.2 %
NONHDLC SERPL-MCNC: 123 MG/DL
PLATELET # BLD AUTO: 262 K/UL
POTASSIUM SERPL-SCNC: 4.4 MMOL/L
PROT SERPL-MCNC: 6.9 G/DL
QUANTIFERON TB PLUS MITOGEN MINUS NIL: >10 IU/ML
QUANTIFERON TB PLUS NIL: 0.01 IU/ML
QUANTIFERON TB PLUS TB1 MINUS NIL: 0 IU/ML
QUANTIFERON TB PLUS TB2 MINUS NIL: 0 IU/ML
RBC # BLD: 3.91 M/UL
RBC # FLD: 14.4 %
SODIUM SERPL-SCNC: 144 MMOL/L
TRIGL SERPL-MCNC: 79 MG/DL
WBC # FLD AUTO: 6.32 K/UL

## 2023-03-29 ENCOUNTER — TRANSCRIPTION ENCOUNTER (OUTPATIENT)
Age: 78
End: 2023-03-29

## 2023-04-12 ENCOUNTER — LABORATORY RESULT (OUTPATIENT)
Age: 78
End: 2023-04-12

## 2023-04-12 ENCOUNTER — TRANSCRIPTION ENCOUNTER (OUTPATIENT)
Age: 78
End: 2023-04-12

## 2023-05-01 ENCOUNTER — TRANSCRIPTION ENCOUNTER (OUTPATIENT)
Age: 78
End: 2023-05-01

## 2023-05-15 ENCOUNTER — NON-APPOINTMENT (OUTPATIENT)
Age: 78
End: 2023-05-15

## 2023-05-15 ENCOUNTER — APPOINTMENT (OUTPATIENT)
Dept: OPHTHALMOLOGY | Facility: CLINIC | Age: 78
End: 2023-05-15
Payer: MEDICARE

## 2023-05-15 PROCEDURE — 95060 OPH MUCOUS MEMBRANE TESTS: CPT

## 2023-05-15 PROCEDURE — 68761 CLOSE TEAR DUCT OPENING: CPT | Mod: E4,E2

## 2023-05-15 PROCEDURE — 92004 COMPRE OPH EXAM NEW PT 1/>: CPT | Mod: 25

## 2023-06-06 ENCOUNTER — TRANSCRIPTION ENCOUNTER (OUTPATIENT)
Age: 78
End: 2023-06-06

## 2023-06-07 ENCOUNTER — TRANSCRIPTION ENCOUNTER (OUTPATIENT)
Age: 78
End: 2023-06-07

## 2023-06-08 ENCOUNTER — TRANSCRIPTION ENCOUNTER (OUTPATIENT)
Age: 78
End: 2023-06-08

## 2023-06-14 ENCOUNTER — TRANSCRIPTION ENCOUNTER (OUTPATIENT)
Age: 78
End: 2023-06-14

## 2023-06-19 ENCOUNTER — APPOINTMENT (OUTPATIENT)
Dept: OPHTHALMOLOGY | Facility: CLINIC | Age: 78
End: 2023-06-19
Payer: MEDICARE

## 2023-06-19 ENCOUNTER — NON-APPOINTMENT (OUTPATIENT)
Age: 78
End: 2023-06-19

## 2023-06-19 PROCEDURE — 68761 CLOSE TEAR DUCT OPENING: CPT | Mod: E1,E3

## 2023-06-19 PROCEDURE — 92012 INTRM OPH EXAM EST PATIENT: CPT | Mod: 25

## 2023-06-20 ENCOUNTER — RX RENEWAL (OUTPATIENT)
Age: 78
End: 2023-06-20

## 2023-06-21 ENCOUNTER — NON-APPOINTMENT (OUTPATIENT)
Age: 78
End: 2023-06-21

## 2023-06-22 ENCOUNTER — TRANSCRIPTION ENCOUNTER (OUTPATIENT)
Age: 78
End: 2023-06-22

## 2023-06-22 LAB
25(OH)D3 SERPL-MCNC: 29.9 NG/ML
ALBUMIN SERPL ELPH-MCNC: 4.4 G/DL
ALP BLD-CCNC: 47 U/L
ALT SERPL-CCNC: 19 U/L
ANION GAP SERPL CALC-SCNC: 12 MMOL/L
AST SERPL-CCNC: 23 U/L
BILIRUB SERPL-MCNC: 0.3 MG/DL
BUN SERPL-MCNC: 20 MG/DL
CALCIUM SERPL-MCNC: 9.4 MG/DL
CD16+CD56+ CELLS # BLD: 192 CELLS/UL
CD16+CD56+ CELLS NFR BLD: 23 %
CD19 CELLS NFR BLD: 0 CELLS/UL
CD3 CELLS # BLD: 637 CELLS/UL
CD3 CELLS NFR BLD: 74 %
CD3+CD4+ CELLS # BLD: 316 CELLS/UL
CD3+CD4+ CELLS NFR BLD: 35 %
CD3+CD4+ CELLS/CD3+CD8+ CLL SPEC: 0.93 RATIO
CD3+CD8+ CELLS # SPEC: 339 CELLS/UL
CD3+CD8+ CELLS NFR BLD: 38 %
CELLS.CD3-CD19+/CELLS IN BLOOD: <1 %
CHLORIDE SERPL-SCNC: 103 MMOL/L
CO2 SERPL-SCNC: 26 MMOL/L
CREAT SERPL-MCNC: 0.77 MG/DL
CRP SERPL-MCNC: <3 MG/L
EGFR: 79 ML/MIN/1.73M2
ERYTHROCYTE [SEDIMENTATION RATE] IN BLOOD BY WESTERGREN METHOD: 2 MM/HR
GLUCOSE SERPL-MCNC: 106 MG/DL
HCT VFR BLD CALC: 37.4 %
HGB BLD-MCNC: 12 G/DL
IGA SER QL IEP: 143 MG/DL
IGG SER QL IEP: 628 MG/DL
IGM SER QL IEP: 37 MG/DL
MCHC RBC-ENTMCNC: 32.1 GM/DL
MCHC RBC-ENTMCNC: 32.7 PG
MCV RBC AUTO: 101.9 FL
PLATELET # BLD AUTO: 246 K/UL
POTASSIUM SERPL-SCNC: 4.6 MMOL/L
PROT SERPL-MCNC: 6.4 G/DL
RBC # BLD: 3.67 M/UL
RBC # FLD: 14.4 %
SODIUM SERPL-SCNC: 141 MMOL/L
WBC # FLD AUTO: 3.61 K/UL

## 2023-06-28 ENCOUNTER — APPOINTMENT (OUTPATIENT)
Dept: RHEUMATOLOGY | Facility: CLINIC | Age: 78
End: 2023-06-28
Payer: MEDICARE

## 2023-06-28 ENCOUNTER — RESULT REVIEW (OUTPATIENT)
Age: 78
End: 2023-06-28

## 2023-06-28 VITALS
HEIGHT: 64 IN | TEMPERATURE: 97.1 F | OXYGEN SATURATION: 98 % | HEART RATE: 93 BPM | DIASTOLIC BLOOD PRESSURE: 68 MMHG | SYSTOLIC BLOOD PRESSURE: 137 MMHG | BODY MASS INDEX: 22.53 KG/M2 | RESPIRATION RATE: 16 BRPM | WEIGHT: 132 LBS

## 2023-06-28 DIAGNOSIS — K21.9 GASTRO-ESOPHAGEAL REFLUX DISEASE W/OUT ESOPHAGITIS: ICD-10-CM

## 2023-06-28 DIAGNOSIS — M18.9 OSTEOARTHRITIS OF FIRST CARPOMETACARPAL JOINT, UNSPECIFIED: ICD-10-CM

## 2023-06-28 PROCEDURE — 96372 THER/PROPH/DIAG INJ SC/IM: CPT

## 2023-06-28 PROCEDURE — 99214 OFFICE O/P EST MOD 30 MIN: CPT | Mod: 25

## 2023-06-28 RX ORDER — FAMOTIDINE 20 MG/1
20 TABLET, FILM COATED ORAL
Refills: 0 | Status: ACTIVE | COMMUNITY

## 2023-06-28 RX ORDER — DENOSUMAB 60 MG/ML
60 INJECTION SUBCUTANEOUS
Qty: 1 | Refills: 0 | Status: COMPLETED | OUTPATIENT
Start: 2023-06-28

## 2023-06-28 RX ADMIN — DENOSUMAB 0 MG/ML: 60 INJECTION SUBCUTANEOUS at 00:00

## 2023-06-28 NOTE — ASSESSMENT
[FreeTextEntry1] : 77   year old female with + CCP RA, h/o LGL on MTX at 8 pills per week since 11/2017 (mtx started initially in 8-2017 and currently on mtx 4 pills a week PO\par CUrrent medications: \par mtx 6 pills a week\par folic acid 2 mg daily \par hcq 200mg daily \par RITUXAN in Feb 2023 with infusion reaction  - need to d/w her hematologist possibility of another biologic given LGL \par low wbc\par \par # RA -mild/moderate disease activity based on clinical impression.  \par Patient with combination of OA and RA which affects PGA\par =>continue mtx 6 pills weekly\par => continue folic acid and HCQ \par =>Will check laboratory tests to look for markers of disease activity and also to assess for medication toxicity.\par has allergies to sulfa and Gi intolerance to multiple NSAIDs. \par * xrays from 5-2021 reviewed and will check again in 5-2023. (ordered today)\par => cannot re-dose rituxan - consider gazyva.  Will follow up b cell subsets and also talk with anum spencer) at [facility]. \par => bloold work today \par \par # hand pain \par clincially more consistent with OA than RA \par check US to confirm \par \par \par # Low WBC and LGL \par repeat cbc with diff today \par \par # bone health - osteoporosis\par DEXA 2/22/19 reviewed - spine T =-3.7 and hip = -1.8 \par fracture in the past. \par new compression fracture in June 2019\par can't tolerate bisphosphates due to passing out and dizziness \par was on Forteo for 2 year about 10 years ago. \par Prolia # 1 June 2019 Prolia # 2 Dec 2019 and 3rd in June 2020 and 4th given dec 2020 and 5th given june 2021 \par Prolia # 6 (12-21-21)\par Prolia # 7 (6-2022)\par Prolia # 8 (12-)\par Prolia # 9  (6-)\par Check vitamin d \par NIH handout on calcium  at prior visit. \par \par Pancreatic Lesion \par concern for neuroendocrine tumor \par follow up with onc about next pet scan \par \par HCM:  \par PNA vaccines with PMD in the past 4-5 years. \par covid vaccine completed and got 3rd vaccine in \par flu shot fall 2022\par \par Risks of, benefits of and alternatives to this treatment plan discussed with patient and patient expressed understanding.\par \par More than 50% of the encounter was spent counselling the patient on differential, workup, disease course, and treatment/management.   Education was provided to the patient during this encounter. All questions and concerns were answered and addressed in detail. The patient verbalized understanding and agreed to plan\par \par Patient has been instructed to call for an earlier appointment if new symptoms develop \par Patient has been instructed to make a follow up appointment  3 months \par \par

## 2023-06-28 NOTE — PHYSICAL EXAM
[General Appearance - Alert] : alert [General Appearance - In No Acute Distress] : in no acute distress Yes [General Appearance - Well Nourished] : well nourished [General Appearance - Well Developed] : well developed [Sclera] : the sclera and conjunctiva were normal [Extraocular Movements] : extraocular movements were intact [Outer Ear] : the ears and nose were normal in appearance [Nasal Cavity] : the nasal mucosa and septum were normal [Neck Appearance] : the appearance of the neck was normal [Skin Color & Pigmentation] : normal skin color and pigmentation [Skin Turgor] : normal skin turgor [] : no rash [Cranial Nerves] : cranial nerves 2-12 were intact [Oriented To Time, Place, And Person] : oriented to person, place, and time [FreeTextEntry1] : OA changes, no synovitis on exam

## 2023-06-28 NOTE — HISTORY OF PRESENT ILLNESS
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 \par \par abdominal symptoms including bloating and stomach pain are a lot better. \par she is on creon and it is helping with her burping partially. \par \par earlier this month had had swelling of the left 3rd pip  and had injection with Dr. Milner and it was very helpful. \par notices increasing pain in the right hand with decreased dexterity \par she has done hand therapy but it was a while ago \par \par issues with blepharitis and rosacea and currently on doxycycline \par + Dry eyes with blepharitis/rosacea and is on multiple eye drops. \par \par \par Significant background: \par + CCP\par + history of CLL/LGL  - normal WBC and not on any therapy .  monitored at [facility]\par   - had bone marrow biopsy and PET scan and blood work. \par + family history of RA (Padmaja Mckeon)\par allergic reaction to iron infusion\par blepharitis\par \par \par \par  [Erosions] : no erosions [Joint Swelling] : no joint swelling [Rash] : no rash [Shortness of Breath] : no shortness of breath [Ocular Symptoms] : no ocular symptoms [Dysphonia] : no dysphonia [Morning Stiffness] : no morning stiffness [Chest Pain] : no chest pain [Fatigue] : no fatigue [TextBox_40] : MTX 8-2017 -> currently on mtx 3 pills a week  [TextBox_42] : Rituxan (5-2018, 6-2018), Xeljanz (1-2020 - 9-2020), Rituxan (7-29-21 and 8-16-21) (5-2022) [TextBox_44] : Rituxan 2-2023 (did not tolerate 2nd infusion) [TextBox_70] : hand are painful - some nights it wakes her from sleep. she takes advil plus advil to be able to sleep without pain. \par doesn’t want to take steroids.

## 2023-06-29 ENCOUNTER — TRANSCRIPTION ENCOUNTER (OUTPATIENT)
Age: 78
End: 2023-06-29

## 2023-06-29 LAB
ALBUMIN SERPL ELPH-MCNC: 4.5 G/DL
ALP BLD-CCNC: 46 U/L
ALT SERPL-CCNC: 17 U/L
ANION GAP SERPL CALC-SCNC: 10 MMOL/L
AST SERPL-CCNC: 20 U/L
BILIRUB SERPL-MCNC: 0.3 MG/DL
BUN SERPL-MCNC: 17 MG/DL
CALCIUM SERPL-MCNC: 9.4 MG/DL
CD16+CD56+ CELLS # BLD: 144 CELLS/UL
CD16+CD56+ CELLS NFR BLD: 14 %
CD19 CELLS NFR BLD: 0 CELLS/UL
CD3 CELLS # BLD: 791 CELLS/UL
CD3 CELLS NFR BLD: 82 %
CD3+CD4+ CELLS # BLD: 466 CELLS/UL
CD3+CD4+ CELLS NFR BLD: 50 %
CD3+CD4+ CELLS/CD3+CD8+ CLL SPEC: 1.56 RATIO
CD3+CD8+ CELLS # SPEC: 299 CELLS/UL
CD3+CD8+ CELLS NFR BLD: 32 %
CELLS.CD3-CD19+/CELLS IN BLOOD: <1 %
CHLORIDE SERPL-SCNC: 105 MMOL/L
CO2 SERPL-SCNC: 27 MMOL/L
CREAT SERPL-MCNC: 0.93 MG/DL
EGFR: 63 ML/MIN/1.73M2
ERYTHROCYTE [SEDIMENTATION RATE] IN BLOOD BY WESTERGREN METHOD: 6 MM/HR
GLUCOSE SERPL-MCNC: 90 MG/DL
IGA SER QL IEP: 125 MG/DL
IGG SER QL IEP: 702 MG/DL
IGM SER QL IEP: 35 MG/DL
POTASSIUM SERPL-SCNC: 4.4 MMOL/L
PROT SERPL-MCNC: 6.5 G/DL
SODIUM SERPL-SCNC: 142 MMOL/L

## 2023-06-30 ENCOUNTER — TRANSCRIPTION ENCOUNTER (OUTPATIENT)
Age: 78
End: 2023-06-30

## 2023-07-14 ENCOUNTER — APPOINTMENT (OUTPATIENT)
Dept: ULTRASOUND IMAGING | Facility: CLINIC | Age: 78
End: 2023-07-14
Payer: MEDICARE

## 2023-07-14 ENCOUNTER — TRANSCRIPTION ENCOUNTER (OUTPATIENT)
Age: 78
End: 2023-07-14

## 2023-07-14 ENCOUNTER — OUTPATIENT (OUTPATIENT)
Dept: OUTPATIENT SERVICES | Facility: HOSPITAL | Age: 78
LOS: 1 days | End: 2023-07-14
Payer: MEDICARE

## 2023-07-14 DIAGNOSIS — M06.9 RHEUMATOID ARTHRITIS, UNSPECIFIED: ICD-10-CM

## 2023-07-14 DIAGNOSIS — Z98.49 CATARACT EXTRACTION STATUS, UNSPECIFIED EYE: Chronic | ICD-10-CM

## 2023-07-14 PROCEDURE — 76881 US COMPL JOINT R-T W/IMG: CPT

## 2023-07-14 PROCEDURE — 76881 US COMPL JOINT R-T W/IMG: CPT | Mod: 26,LT

## 2023-07-17 ENCOUNTER — TRANSCRIPTION ENCOUNTER (OUTPATIENT)
Age: 78
End: 2023-07-17

## 2023-07-19 ENCOUNTER — NON-APPOINTMENT (OUTPATIENT)
Age: 78
End: 2023-07-19

## 2023-07-19 ENCOUNTER — APPOINTMENT (OUTPATIENT)
Dept: OPHTHALMOLOGY | Facility: CLINIC | Age: 78
End: 2023-07-19
Payer: MEDICARE

## 2023-07-19 PROCEDURE — 92012 INTRM OPH EXAM EST PATIENT: CPT | Mod: 25

## 2023-07-19 PROCEDURE — 68761 CLOSE TEAR DUCT OPENING: CPT | Mod: E3,E1

## 2023-07-19 PROCEDURE — 67820 REVISE EYELASHES: CPT | Mod: E3

## 2023-07-24 ENCOUNTER — APPOINTMENT (OUTPATIENT)
Dept: RADIOLOGY | Facility: IMAGING CENTER | Age: 78
End: 2023-07-24
Payer: MEDICARE

## 2023-07-24 ENCOUNTER — OUTPATIENT (OUTPATIENT)
Dept: OUTPATIENT SERVICES | Facility: HOSPITAL | Age: 78
LOS: 1 days | End: 2023-07-24
Payer: MEDICARE

## 2023-07-24 ENCOUNTER — TRANSCRIPTION ENCOUNTER (OUTPATIENT)
Age: 78
End: 2023-07-24

## 2023-07-24 DIAGNOSIS — M06.9 RHEUMATOID ARTHRITIS, UNSPECIFIED: ICD-10-CM

## 2023-07-24 DIAGNOSIS — Z98.49 CATARACT EXTRACTION STATUS, UNSPECIFIED EYE: Chronic | ICD-10-CM

## 2023-07-24 PROCEDURE — 77080 DXA BONE DENSITY AXIAL: CPT | Mod: 26

## 2023-07-24 PROCEDURE — 73630 X-RAY EXAM OF FOOT: CPT | Mod: 26,50

## 2023-07-24 PROCEDURE — 73130 X-RAY EXAM OF HAND: CPT

## 2023-07-24 PROCEDURE — 73130 X-RAY EXAM OF HAND: CPT | Mod: 26,50

## 2023-07-24 PROCEDURE — 77080 DXA BONE DENSITY AXIAL: CPT

## 2023-07-24 PROCEDURE — 73630 X-RAY EXAM OF FOOT: CPT

## 2023-07-25 ENCOUNTER — TRANSCRIPTION ENCOUNTER (OUTPATIENT)
Age: 78
End: 2023-07-25

## 2023-07-26 ENCOUNTER — TRANSCRIPTION ENCOUNTER (OUTPATIENT)
Age: 78
End: 2023-07-26

## 2023-08-30 ENCOUNTER — APPOINTMENT (OUTPATIENT)
Dept: OPHTHALMOLOGY | Facility: CLINIC | Age: 78
End: 2023-08-30
Payer: MEDICARE

## 2023-08-30 ENCOUNTER — NON-APPOINTMENT (OUTPATIENT)
Age: 78
End: 2023-08-30

## 2023-08-30 PROCEDURE — 67820 REVISE EYELASHES: CPT | Mod: E3

## 2023-08-30 PROCEDURE — 68761 CLOSE TEAR DUCT OPENING: CPT | Mod: E1,E3

## 2023-08-30 PROCEDURE — 92012 INTRM OPH EXAM EST PATIENT: CPT | Mod: 25

## 2023-09-11 ENCOUNTER — APPOINTMENT (OUTPATIENT)
Dept: RHEUMATOLOGY | Facility: CLINIC | Age: 78
End: 2023-09-11
Payer: MEDICARE

## 2023-09-11 ENCOUNTER — RX RENEWAL (OUTPATIENT)
Age: 78
End: 2023-09-11

## 2023-09-11 VITALS
TEMPERATURE: 97.1 F | RESPIRATION RATE: 16 BRPM | OXYGEN SATURATION: 98 % | HEART RATE: 86 BPM | WEIGHT: 125 LBS | SYSTOLIC BLOOD PRESSURE: 152 MMHG | HEIGHT: 64 IN | DIASTOLIC BLOOD PRESSURE: 77 MMHG | BODY MASS INDEX: 21.34 KG/M2

## 2023-09-11 DIAGNOSIS — M25.519 PAIN IN UNSPECIFIED SHOULDER: ICD-10-CM

## 2023-09-11 DIAGNOSIS — M81.0 AGE-RELATED OSTEOPOROSIS W/OUT CURRENT PATHOLOGICAL FRACTURE: ICD-10-CM

## 2023-09-11 PROCEDURE — 20610 DRAIN/INJ JOINT/BURSA W/O US: CPT | Mod: GC,LT

## 2023-09-11 PROCEDURE — 99214 OFFICE O/P EST MOD 30 MIN: CPT | Mod: 25,GC

## 2023-09-11 PROCEDURE — 96401 CHEMO ANTI-NEOPL SQ/IM: CPT | Mod: GC

## 2023-09-11 RX ORDER — RITUXIMAB 10 MG/ML
500 INJECTION, SOLUTION INTRAVENOUS
Qty: 4 | Refills: 0 | Status: COMPLETED | COMMUNITY
Start: 2020-12-18 | End: 2023-02-23

## 2023-09-11 RX ORDER — LIDOCAINE HYDROCHLORIDE 10 MG/ML
1 INJECTION, SOLUTION INFILTRATION; PERINEURAL
Refills: 0 | Status: COMPLETED | OUTPATIENT
Start: 2023-09-11

## 2023-09-11 RX ORDER — METHYLPRED ACET/NACL,ISO-OS/PF 40 MG/ML
40 VIAL (ML) INJECTION
Qty: 1 | Refills: 0 | Status: COMPLETED | OUTPATIENT
Start: 2023-09-11

## 2023-09-11 RX ORDER — METHOTREXATE 20 MG/.4ML
20 INJECTION, SOLUTION SUBCUTANEOUS
Refills: 0 | Status: COMPLETED | OUTPATIENT
Start: 2023-09-11

## 2023-09-11 RX ADMIN — LIDOCAINE HYDROCHLORIDE %: 10 INJECTION, SOLUTION INFILTRATION; PERINEURAL at 00:00

## 2023-09-11 RX ADMIN — METHYLPREDNISOLONE ACETATE MG/ML: 40 INJECTION, SUSPENSION INTRA-ARTICULAR; INTRALESIONAL; INTRAMUSCULAR; SOFT TISSUE at 00:00

## 2023-09-11 RX ADMIN — METHOTREXATE 20 MG/0.4ML: 20 INJECTION, SOLUTION SUBCUTANEOUS at 00:00

## 2023-09-12 ENCOUNTER — TRANSCRIPTION ENCOUNTER (OUTPATIENT)
Age: 78
End: 2023-09-12

## 2023-09-12 LAB
25(OH)D3 SERPL-MCNC: 34.3 NG/ML
ALBUMIN SERPL ELPH-MCNC: 4.5 G/DL
ALP BLD-CCNC: 42 U/L
ALT SERPL-CCNC: 14 U/L
ANION GAP SERPL CALC-SCNC: 11 MMOL/L
AST SERPL-CCNC: 19 U/L
BASOPHILS # BLD AUTO: 0.04 K/UL
BASOPHILS NFR BLD AUTO: 0.9 %
BILIRUB SERPL-MCNC: 0.2 MG/DL
BUN SERPL-MCNC: 16 MG/DL
CALCIUM SERPL-MCNC: 9.4 MG/DL
CD16+CD56+ CELLS # BLD: 102 CELLS/UL
CD16+CD56+ CELLS NFR BLD: 12 %
CD19 CELLS NFR BLD: 2 CELLS/UL
CD3 CELLS # BLD: 728 CELLS/UL
CD3 CELLS NFR BLD: 84 %
CD3+CD4+ CELLS # BLD: 453 CELLS/UL
CD3+CD4+ CELLS NFR BLD: 52 %
CD3+CD4+ CELLS/CD3+CD8+ CLL SPEC: 1.52 RATIO
CD3+CD8+ CELLS # SPEC: 298 CELLS/UL
CD3+CD8+ CELLS NFR BLD: 34 %
CELLS.CD3-CD19+/CELLS IN BLOOD: <1 %
CHLORIDE SERPL-SCNC: 102 MMOL/L
CO2 SERPL-SCNC: 27 MMOL/L
CREAT SERPL-MCNC: 0.81 MG/DL
CRP SERPL-MCNC: 3 MG/L
EGFR: 74 ML/MIN/1.73M2
EOSINOPHIL # BLD AUTO: 0.1 K/UL
EOSINOPHIL NFR BLD AUTO: 2.2 %
ERYTHROCYTE [SEDIMENTATION RATE] IN BLOOD BY WESTERGREN METHOD: 8 MM/HR
GLUCOSE SERPL-MCNC: 96 MG/DL
HCT VFR BLD CALC: 35.7 %
HGB BLD-MCNC: 11.5 G/DL
IGA SER QL IEP: 137 MG/DL
IGG SER QL IEP: 733 MG/DL
IGM SER QL IEP: 43 MG/DL
IMM GRANULOCYTES NFR BLD AUTO: 0.2 %
LYMPHOCYTES # BLD AUTO: 0.76 K/UL
LYMPHOCYTES NFR BLD AUTO: 17 %
MAN DIFF?: NORMAL
MCHC RBC-ENTMCNC: 32.2 GM/DL
MCHC RBC-ENTMCNC: 32.6 PG
MCV RBC AUTO: 101.1 FL
MONOCYTES # BLD AUTO: 0.55 K/UL
MONOCYTES NFR BLD AUTO: 12.3 %
NEUTROPHILS # BLD AUTO: 3.02 K/UL
NEUTROPHILS NFR BLD AUTO: 67.4 %
PLATELET # BLD AUTO: 237 K/UL
POTASSIUM SERPL-SCNC: 4.6 MMOL/L
PROT SERPL-MCNC: 6.5 G/DL
RBC # BLD: 3.53 M/UL
RBC # FLD: 14.4 %
SODIUM SERPL-SCNC: 140 MMOL/L
WBC # FLD AUTO: 4.48 K/UL

## 2023-09-29 ENCOUNTER — TRANSCRIPTION ENCOUNTER (OUTPATIENT)
Age: 78
End: 2023-09-29

## 2023-10-18 ENCOUNTER — APPOINTMENT (OUTPATIENT)
Dept: OPHTHALMOLOGY | Facility: CLINIC | Age: 78
End: 2023-10-18
Payer: MEDICARE

## 2023-10-18 ENCOUNTER — NON-APPOINTMENT (OUTPATIENT)
Age: 78
End: 2023-10-18

## 2023-10-18 PROCEDURE — 67820 REVISE EYELASHES: CPT | Mod: E3,E4,RT

## 2023-10-18 PROCEDURE — 68761 CLOSE TEAR DUCT OPENING: CPT | Mod: E1,E3,E4

## 2023-10-18 PROCEDURE — 92012 INTRM OPH EXAM EST PATIENT: CPT | Mod: 25

## 2023-10-27 ENCOUNTER — RX RENEWAL (OUTPATIENT)
Age: 78
End: 2023-10-27

## 2023-11-01 ENCOUNTER — NON-APPOINTMENT (OUTPATIENT)
Age: 78
End: 2023-11-01

## 2023-11-22 ENCOUNTER — APPOINTMENT (OUTPATIENT)
Dept: OPHTHALMOLOGY | Facility: CLINIC | Age: 78
End: 2023-11-22
Payer: MEDICARE

## 2023-11-22 ENCOUNTER — NON-APPOINTMENT (OUTPATIENT)
Age: 78
End: 2023-11-22

## 2023-11-22 PROCEDURE — 92012 INTRM OPH EXAM EST PATIENT: CPT

## 2023-12-13 ENCOUNTER — APPOINTMENT (OUTPATIENT)
Dept: OPHTHALMOLOGY | Facility: CLINIC | Age: 78
End: 2023-12-13

## 2023-12-15 ENCOUNTER — NON-APPOINTMENT (OUTPATIENT)
Age: 78
End: 2023-12-15

## 2023-12-15 ENCOUNTER — APPOINTMENT (OUTPATIENT)
Dept: OPHTHALMOLOGY | Facility: CLINIC | Age: 78
End: 2023-12-15
Payer: MEDICARE

## 2023-12-15 PROCEDURE — 99214 OFFICE O/P EST MOD 30 MIN: CPT

## 2023-12-18 ENCOUNTER — APPOINTMENT (OUTPATIENT)
Dept: RHEUMATOLOGY | Facility: CLINIC | Age: 78
End: 2023-12-18
Payer: MEDICARE

## 2023-12-18 VITALS
BODY MASS INDEX: 21.34 KG/M2 | HEART RATE: 90 BPM | HEIGHT: 64 IN | DIASTOLIC BLOOD PRESSURE: 76 MMHG | OXYGEN SATURATION: 98 % | WEIGHT: 125 LBS | SYSTOLIC BLOOD PRESSURE: 137 MMHG

## 2023-12-18 DIAGNOSIS — M06.9 RHEUMATOID ARTHRITIS, UNSPECIFIED: ICD-10-CM

## 2023-12-18 DIAGNOSIS — D49.0 NEOPLASM OF UNSPECIFIED BEHAVIOR OF DIGESTIVE SYSTEM: ICD-10-CM

## 2023-12-18 PROCEDURE — 99214 OFFICE O/P EST MOD 30 MIN: CPT

## 2023-12-18 RX ORDER — OMEPRAZOLE 40 MG/1
40 CAPSULE, DELAYED RELEASE ORAL
Qty: 90 | Refills: 0 | Status: COMPLETED | COMMUNITY
Start: 2020-08-02 | End: 2023-12-18

## 2023-12-18 RX ORDER — METHOTREXATE 2.5 MG/1
2.5 TABLET ORAL
Qty: 72 | Refills: 0 | Status: DISCONTINUED | COMMUNITY
Start: 2022-05-09 | End: 2023-12-18

## 2023-12-18 RX ORDER — FLUTICASONE PROPIONATE 50 UG/1
50 SPRAY, METERED NASAL
Qty: 16 | Refills: 0 | Status: COMPLETED | COMMUNITY
Start: 2020-05-07 | End: 2023-12-18

## 2023-12-18 RX ORDER — BENZONATATE 100 MG/1
100 CAPSULE ORAL
Qty: 15 | Refills: 0 | Status: COMPLETED | COMMUNITY
Start: 2020-10-17 | End: 2023-12-18

## 2023-12-18 NOTE — PHYSICAL EXAM
[General Appearance - Alert] : alert [General Appearance - In No Acute Distress] : in no acute distress [General Appearance - Well Developed] : well developed [General Appearance - Well Nourished] : well nourished [Sclera] : the sclera and conjunctiva were normal [Extraocular Movements] : extraocular movements were intact [Outer Ear] : the ears and nose were normal in appearance [Nasal Cavity] : the nasal mucosa and septum were normal [Neck Appearance] : the appearance of the neck was normal [Skin Turgor] : normal skin turgor [Skin Color & Pigmentation] : normal skin color and pigmentation [] : no rash [Cranial Nerves] : cranial nerves 2-12 were intact [Oriented To Time, Place, And Person] : oriented to person, place, and time [FreeTextEntry1] : OA changes. 2 swollen pip and multiple enlarged PIP

## 2023-12-18 NOTE — DATA REVIEWED
[FreeTextEntry1] : Laboratory and radiology studies that were personally reviewed at today's visit are summarized below and above:  : X-ray hands and feet 7-24-23 OA changes  US 7-14-23 small effusions within b/l 2nd MCP joints with mild synovitis  Dexa 7-25-23 osteoporosis -3.1 spine   Ortho note  & 12-8-21  right 3rd pip injection 7-8-2021:  HAnd US with OA and with scattered joint effusions without hyperemia (doppler negative) .   5-:  DeXA - Osteoporosis - based on spine of -3.1 and fem tneck =-2.3 and total hip = -1.3,  5-:   X-ray:  hand/wrist/foot/ankle OA changes and no ra damage.  12-:  Cspine xray - normal C1-C2 junction and multipevel DJD   HepB/C/quantiferon - negative () DEXA (2-2019): spine T=-3.7, fem total  T=-1.8  MRI abdomen 9-2020 with cystic lesions in pancreas c/w intraductal papillary mucinous neoplasm  12-17-19:  cervical spine xray:   No compression fractures.  Multilevel degenerative disease manifest by varying degrees of disc space narrowing with disc margin osteophyte formation and posterior facet arthrosis.  Slight C4 and C5 anterolisthesis apparent on flexion decreases on extension indicating degree of  dynamic instability at this level. Remaining vertebral body alignment maintained and no evidence  for atlantoaxial dynamic instability.  Prevertebral soft tissues and predental interval within normal limits.  Atlantoaxial and posterior facet alignment maintained.  Intact odontoid. Generalized osteopenia otherwise no discrete lytic or blastic lesions.  Clear visualized lung apices and midline normal caliber trachea.  6-13-19:  CD19 - 2%  9-12-19:  vitamin d 42.7    april 2017 labs reviewed  ccp of 82  EXAM: MR SHOULDER RT    PROCEDURE DATE: 12/02/2017     INTERPRETATION: MR SHOULDER RT dated 12/2/2017 2:00 PM   INDICATION: Right shoulder pain. Difficulty moving arm.   TECHNIQUE: Multi-sequential, multiplanar MRI imaging of the shoulder was  performed per standard protocol.   COMPARISON: None available.   FINDINGS: There is moderate productive change at the acromioclavicular joint  mild lateral downsloping of the acromion. A moderate amount of fluid is seen  in the subacromial/subdeltoid bursa with mild synovitis.   There is mild tendinosis within the infraspinatus tendon. No full-thickness  tear or tendon retraction is appreciated. The subscapularis, teres minor,  and supraspinatus tendons are preserved. The biceps tendon is within the  groove and normal in appearance. There is no muscle atrophy.   There is no glenohumeral joint effusion. Motion artifact mildly degrades  detailed evaluation of the glenoid labrum. Given this limitation, no  displaced labral tear is seen. Preservation of the glenohumeral cartilage is  noted. There is no fracture.   There is no soft tissue swelling. No soft tissue mass is identified.    IMPRESSION:  Moderate subacromial/subdeltoid bursitis with synovitis.  Moderate acromioclavicular DJD.

## 2023-12-18 NOTE — REASON FOR VISIT
[FreeTextEntry1] : RA follow up, pancreatitis, LGL, intraductal papillary mucinous neoplasm, hypothyroid

## 2023-12-19 ENCOUNTER — TRANSCRIPTION ENCOUNTER (OUTPATIENT)
Age: 78
End: 2023-12-19

## 2023-12-19 LAB
25(OH)D3 SERPL-MCNC: 35.5 NG/ML
ALBUMIN SERPL ELPH-MCNC: 4.4 G/DL
ALP BLD-CCNC: 43 U/L
ALT SERPL-CCNC: 23 U/L
AMYLASE/CREAT SERPL: 58 U/L
ANION GAP SERPL CALC-SCNC: 11 MMOL/L
AST SERPL-CCNC: 27 U/L
BASOPHILS # BLD AUTO: 0.05 K/UL
BASOPHILS NFR BLD AUTO: 1 %
BILIRUB SERPL-MCNC: 0.3 MG/DL
BUN SERPL-MCNC: 17 MG/DL
CALCIUM SERPL-MCNC: 9.6 MG/DL
CD16+CD56+ CELLS # BLD: 128 CELLS/UL
CD16+CD56+ CELLS NFR BLD: 12 %
CD19 CELLS NFR BLD: 22 CELLS/UL
CD3 CELLS # BLD: 843 CELLS/UL
CD3 CELLS NFR BLD: 82 %
CD3+CD4+ CELLS # BLD: 544 CELLS/UL
CD3+CD4+ CELLS NFR BLD: 54 %
CD3+CD4+ CELLS/CD3+CD8+ CLL SPEC: 1.81 RATIO
CD3+CD8+ CELLS # SPEC: 300 CELLS/UL
CD3+CD8+ CELLS NFR BLD: 30 %
CELLS.CD3-CD19+/CELLS IN BLOOD: 2 %
CHLORIDE SERPL-SCNC: 103 MMOL/L
CO2 SERPL-SCNC: 27 MMOL/L
CREAT SERPL-MCNC: 0.76 MG/DL
CRP SERPL-MCNC: <3 MG/L
EGFR: 80 ML/MIN/1.73M2
EOSINOPHIL # BLD AUTO: 0.08 K/UL
EOSINOPHIL NFR BLD AUTO: 1.6 %
ERYTHROCYTE [SEDIMENTATION RATE] IN BLOOD BY WESTERGREN METHOD: 7 MM/HR
GLUCOSE SERPL-MCNC: 92 MG/DL
HCT VFR BLD CALC: 35.3 %
HGB BLD-MCNC: 11.2 G/DL
IGA SER QL IEP: 128 MG/DL
IGG SER QL IEP: 743 MG/DL
IGM SER QL IEP: 43 MG/DL
IMM GRANULOCYTES NFR BLD AUTO: 0.2 %
LPL SERPL-CCNC: 38 U/L
LYMPHOCYTES # BLD AUTO: 0.99 K/UL
LYMPHOCYTES NFR BLD AUTO: 20.4 %
MAN DIFF?: NORMAL
MCHC RBC-ENTMCNC: 31.7 GM/DL
MCHC RBC-ENTMCNC: 32.1 PG
MCV RBC AUTO: 101.1 FL
MONOCYTES # BLD AUTO: 0.65 K/UL
MONOCYTES NFR BLD AUTO: 13.4 %
NEUTROPHILS # BLD AUTO: 3.08 K/UL
NEUTROPHILS NFR BLD AUTO: 63.4 %
PLATELET # BLD AUTO: 247 K/UL
POTASSIUM SERPL-SCNC: 4.3 MMOL/L
PROT SERPL-MCNC: 6.2 G/DL
RBC # BLD: 3.49 M/UL
RBC # FLD: 14.8 %
SODIUM SERPL-SCNC: 141 MMOL/L
WBC # FLD AUTO: 4.86 K/UL

## 2023-12-19 RX ORDER — DENOSUMAB 60 MG/ML
60 INJECTION SUBCUTANEOUS
Qty: 1 | Refills: 0 | Status: ACTIVE | COMMUNITY
Start: 2019-04-29

## 2023-12-20 ENCOUNTER — TRANSCRIPTION ENCOUNTER (OUTPATIENT)
Age: 78
End: 2023-12-20

## 2023-12-29 ENCOUNTER — MED ADMIN CHARGE (OUTPATIENT)
Age: 78
End: 2023-12-29

## 2023-12-29 ENCOUNTER — APPOINTMENT (OUTPATIENT)
Dept: RHEUMATOLOGY | Facility: CLINIC | Age: 78
End: 2023-12-29
Payer: MEDICARE

## 2023-12-29 VITALS
SYSTOLIC BLOOD PRESSURE: 140 MMHG | RESPIRATION RATE: 16 BRPM | HEART RATE: 75 BPM | DIASTOLIC BLOOD PRESSURE: 82 MMHG | OXYGEN SATURATION: 98 % | TEMPERATURE: 98 F

## 2023-12-29 PROCEDURE — 96372 THER/PROPH/DIAG INJ SC/IM: CPT

## 2023-12-29 RX ORDER — DENOSUMAB 60 MG/ML
60 INJECTION SUBCUTANEOUS
Qty: 1 | Refills: 0 | Status: COMPLETED | OUTPATIENT
Start: 2023-12-29

## 2023-12-29 RX ADMIN — DENOSUMAB 0 MG/ML: 60 INJECTION SUBCUTANEOUS at 00:00

## 2024-01-03 ENCOUNTER — TRANSCRIPTION ENCOUNTER (OUTPATIENT)
Age: 79
End: 2024-01-03

## 2024-01-15 ENCOUNTER — RX RENEWAL (OUTPATIENT)
Age: 79
End: 2024-01-15

## 2024-01-30 ENCOUNTER — APPOINTMENT (OUTPATIENT)
Dept: RHEUMATOLOGY | Facility: CLINIC | Age: 79
End: 2024-01-30
Payer: MEDICARE

## 2024-01-30 VITALS
BODY MASS INDEX: 21.68 KG/M2 | OXYGEN SATURATION: 98 % | TEMPERATURE: 98.1 F | HEART RATE: 80 BPM | SYSTOLIC BLOOD PRESSURE: 145 MMHG | HEIGHT: 64 IN | WEIGHT: 127 LBS | DIASTOLIC BLOOD PRESSURE: 83 MMHG | RESPIRATION RATE: 16 BRPM

## 2024-01-30 DIAGNOSIS — C91.Z0 OTHER LYMPHOID LEUKEMIA NOT HAVING ACHIEVED REMISSION: ICD-10-CM

## 2024-01-30 PROCEDURE — G2211 COMPLEX E/M VISIT ADD ON: CPT

## 2024-01-30 PROCEDURE — 99214 OFFICE O/P EST MOD 30 MIN: CPT

## 2024-01-30 NOTE — HISTORY OF PRESENT ILLNESS
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  last Rituxan was not able to tolerate infusion due to allergic reaction on 2 - 23 - 2023 (2nd infusion in this series)  MTX was increased from 6 pills to 8 pills weekly after US done showed mild RA activity  using mtx pills now 8 per week but this week she took injectable instead (last dose yesterday)  she notes:  1)  hair loss 2)  jaw pain with opening her mouth, 3)  left arm/elbow/shoudler pain and 4) increased hand pain  also on abx for sinus infection (started on friday)    Recent diagnosis of : neuroendocrine tumor - she is being monitored at [place] and has been told she doesn't need  surgery or therapy at this time.Pending follow up in the winter with onc  issues with blepharitis and rosacea + Dry eyes with blepharitis/rosacea and is on multiple eye drops.   Significant background:  + CCP + history of CLL/LGL  - normal WBC and not on any therapy .  monitored at [facility]   - had bone marrow biopsy and PET scan and blood work.  + family history of RA (Padmaja Mckeon) allergic reaction to iron infusion blepharitis     [Erosions] : no erosions [Joint Swelling] : joint swelling [Rash] : no rash [Shortness of Breath] : no shortness of breath [Ocular Symptoms] : no ocular symptoms [Dysphonia] : no dysphonia [Morning Stiffness] : morning stiffness [Chest Pain] : no chest pain [Fatigue] : no fatigue [TextBox_40] : MTX 8-2017 -> currently on mtx 8 pills a week  [TextBox_42] : Rituxan (5-2018, 6-2018), Xeljanz (1-2020 - 9-2020), Rituxan (7-29-21 and 8-16-21) (5-2022) [TextBox_44] : Rituxan 2-2023 (did not tolerate 2nd infusion)

## 2024-01-30 NOTE — PHYSICAL EXAM
[General Appearance - Alert] : alert [General Appearance - In No Acute Distress] : in no acute distress [General Appearance - Well Nourished] : well nourished [General Appearance - Well Developed] : well developed [Sclera] : the sclera and conjunctiva were normal [Extraocular Movements] : extraocular movements were intact [Outer Ear] : the ears and nose were normal in appearance [Nasal Cavity] : the nasal mucosa and septum were normal [Neck Appearance] : the appearance of the neck was normal [Skin Color & Pigmentation] : normal skin color and pigmentation [Skin Turgor] : normal skin turgor [] : no rash [Cranial Nerves] : cranial nerves 2-12 were intact [Oriented To Time, Place, And Person] : oriented to person, place, and time [FreeTextEntry1] : OA changes  ttp in 1st mcp bilaterally and left elbow and ttp with TMJ palpation with excursion.

## 2024-01-30 NOTE — ASSESSMENT
[FreeTextEntry1] : 78 year old female with + CCP RA, h/o LGL on MTX at 8 pills per week since 11/2017 (mtx started initially in 8-2017 and currently on mtx 8 pills a week PO Current medications: mtx 8 pills a week (sq done this week at home with zonia)  folic acid 2 mg daily hcq 200mg daily RITUXAN in Feb 2023 with infusion reaction - need to d/w her hematologist possibility of another biologic given LGL low wbc   # RA - increasing disease activity => continue folic acid and HCQ and methotreaxte  => check methotreaxte level  => trial of steroids  => reach out to anum (ashok at [facility] [place]) to discuss gazyva (had infusion reaction to rituxan)  =>Will check laboratory tests to look for markers of disease activity and also to assess for medication toxicity. => has allergies to sulfa and Gi intolerance to multiple NSAIDs. * xrays from 5-2023 reviewed => cannot re-dose rituxan, will monitor on increased MTX, if fails will consider gazyva. Will follow up b cell subsets and also talk with anum (ashok) at [facility].  # hand pain US 7-14-23 showed small effusions within b/l 2nd MCP joints with mild synovitis, increased MTX to 8 pills as above after US results.  May consider repeating US in 9-12 months   # Low WBC and LGL repeat cbc with diff today  # bone health - osteoporosis DEXA 7/23/23 reviewed - spine T=-3.1 and hip=-1.3 DEXA 2/22/19 reviewed - spine T =-3.7 and hip = -1.8 fracture in the past. new compression fracture in June 2019 can't tolerate bisphosphates due to passing out and dizziness was on Forteo for 2 year about 10 years ago. Prolia # 1 June 2019 Prolia # 2 Dec 2019 and 3rd in June 2020 and 4th given dec 2020 and 5th given june 2021 Prolia # 6 (12-21-21) Prolia # 7 (6-2022) Prolia # 8 (12-) Prolia # 9 (6-) Prolia #10 (12-) Prolia # 11 (6-) Prolia # 11 (12-)  Check vitamin d doesn't use calcium pills due to constipation but does have high calcium content in diet   Pancreatic Lesion concern for neuroendocrine tumor Continues f/u with [facility]  More than 50% of the encounter was spent counseling SUSHANT SANDOVAL on the differential, workup, disease course, and treatment/management.   Education was provided to SUSHANT SANDOVAL during this encounter. All questions and concerns were answered and addressed in detail.  SUSHANT SANDOVAL verbalized understanding and agreed to the plan.   SUSHANT SANDOVAL has been instructed to call for an earlier appointment if new symptoms develop in the interim. SUSHANT SANDOVAL has been instructed to make a follow-up appointment in march 2024

## 2024-02-01 ENCOUNTER — TRANSCRIPTION ENCOUNTER (OUTPATIENT)
Age: 79
End: 2024-02-01

## 2024-02-01 LAB
ALBUMIN SERPL ELPH-MCNC: 4.3 G/DL
ALP BLD-CCNC: 52 U/L
ALT SERPL-CCNC: 21 U/L
ANION GAP SERPL CALC-SCNC: 11 MMOL/L
AST SERPL-CCNC: 23 U/L
BASOPHILS # BLD AUTO: 0.04 K/UL
BASOPHILS NFR BLD AUTO: 0.9 %
BILIRUB SERPL-MCNC: 0.3 MG/DL
BUN SERPL-MCNC: 19 MG/DL
CALCIUM SERPL-MCNC: 8.7 MG/DL
CHLORIDE SERPL-SCNC: 103 MMOL/L
CO2 SERPL-SCNC: 26 MMOL/L
CREAT SERPL-MCNC: 0.71 MG/DL
CRP SERPL-MCNC: 4 MG/L
EGFR: 87 ML/MIN/1.73M2
EOSINOPHIL # BLD AUTO: 0.19 K/UL
EOSINOPHIL NFR BLD AUTO: 4.2 %
ERYTHROCYTE [SEDIMENTATION RATE] IN BLOOD BY WESTERGREN METHOD: 17 MM/HR
GLUCOSE SERPL-MCNC: 105 MG/DL
HAV IGM SER QL: NONREACTIVE
HBV CORE IGG+IGM SER QL: NONREACTIVE
HBV CORE IGM SER QL: NONREACTIVE
HBV SURFACE AG SER QL: NONREACTIVE
HCT VFR BLD CALC: 35.8 %
HCV AB SER QL: NONREACTIVE
HCV S/CO RATIO: 0.08 S/CO
HGB BLD-MCNC: 11.5 G/DL
IMM GRANULOCYTES NFR BLD AUTO: 0.2 %
LYMPHOCYTES # BLD AUTO: 0.83 K/UL
LYMPHOCYTES NFR BLD AUTO: 18.2 %
MAN DIFF?: NORMAL
MCHC RBC-ENTMCNC: 32.1 GM/DL
MCHC RBC-ENTMCNC: 32.9 PG
MCV RBC AUTO: 102.3 FL
MONOCYTES # BLD AUTO: 0.59 K/UL
MONOCYTES NFR BLD AUTO: 13 %
NEUTROPHILS # BLD AUTO: 2.89 K/UL
NEUTROPHILS NFR BLD AUTO: 63.5 %
PLATELET # BLD AUTO: 258 K/UL
POTASSIUM SERPL-SCNC: 4.4 MMOL/L
PROT SERPL-MCNC: 6.4 G/DL
RBC # BLD: 3.5 M/UL
RBC # FLD: 15.1 %
SODIUM SERPL-SCNC: 140 MMOL/L
WBC # FLD AUTO: 4.55 K/UL

## 2024-02-02 LAB
M TB IFN-G BLD-IMP: NEGATIVE
QUANTIFERON TB PLUS MITOGEN MINUS NIL: 9.46 IU/ML
QUANTIFERON TB PLUS NIL: 0.02 IU/ML
QUANTIFERON TB PLUS TB1 MINUS NIL: 0 IU/ML
QUANTIFERON TB PLUS TB2 MINUS NIL: 0.01 IU/ML

## 2024-02-05 ENCOUNTER — TRANSCRIPTION ENCOUNTER (OUTPATIENT)
Age: 79
End: 2024-02-05

## 2024-02-05 LAB — METHOTREXATE POLYGLUTAMATES CONCENTRATION: 59

## 2024-02-05 RX ORDER — SYRINGE,SAFETY WITH NEEDLE,3ML 25GX5/8"
25G X 5/8" SYRINGE, EMPTY DISPOSABLE MISCELLANEOUS
Qty: 1 | Refills: 0 | Status: ACTIVE | COMMUNITY
Start: 2024-02-05 | End: 1900-01-01

## 2024-02-06 RX ORDER — SYRINGE AND NEEDLE,INSULIN,1ML 25GX5/8"
25G X 5/8" SYRINGE, EMPTY DISPOSABLE MISCELLANEOUS
Qty: 1 | Refills: 1 | Status: ACTIVE | COMMUNITY
Start: 2024-02-06 | End: 1900-01-01

## 2024-02-13 ENCOUNTER — TRANSCRIPTION ENCOUNTER (OUTPATIENT)
Age: 79
End: 2024-02-13

## 2024-02-13 NOTE — HISTORY OF PRESENT ILLNESS
Sarabjit Stock- Can you make sure that based on her CTA head/neck results that her thyroid has been adequately evaluated in the past (I know that she had an ultrasound previously but didn't dig too far into that).  Also, radiologist is recommending possible CT chest to look at pulmonary micronodules in follow up, and I will defer to you on whether that would be indicated.  Thanks! 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 Also now on oral doxycycline for ocular rosacea.  isn't sure if she had this nausea with the injectable MTX  she notes that she doesn't take Zofran because it makes her constipated.   Recent diagnosis of : neuroendocrine tumor - she is being monitored at [place] and has been told she doesn't need  surgery or therapy at this time.Pending follow up in the winter with onc  issues with blepharitis and rosacea and currently on doxycycline, dose recently doubled by ophthalmology  + Dry eyes with blepharitis/rosacea and is on multiple eye drops.   Significant background:  + CCP + history of CLL/LGL  - normal WBC and not on any therapy .  monitored at [facility]   - had bone marrow biopsy and PET scan and blood work.  + family history of RA (Padmaja Mckeon) allergic reaction to iron infusion blepharitis     [Erosions] : no erosions [Joint Swelling] : no joint swelling [Rash] : no rash [Shortness of Breath] : no shortness of breath [Ocular Symptoms] : no ocular symptoms [Dysphonia] : no dysphonia [Morning Stiffness] : no morning stiffness [Chest Pain] : no chest pain [Fatigue] : no fatigue [TextBox_40] : MTX 8-2017 -> currently on mtx 8 pills a week  [TextBox_42] : Rituxan (5-2018, 6-2018), Xeljanz (1-2020 - 9-2020), Rituxan (7-29-21 and 8-16-21) (5-2022) [TextBox_44] : Rituxan 2-2023 (did not tolerate 2nd infusion) [TextBox_70] : occasional bilateral hand pain but it is tolerable

## 2024-02-19 ENCOUNTER — TRANSCRIPTION ENCOUNTER (OUTPATIENT)
Age: 79
End: 2024-02-19

## 2024-02-19 DIAGNOSIS — M06.9 RHEUMATOID ARTHRITIS, UNSPECIFIED: ICD-10-CM

## 2024-02-20 ENCOUNTER — OUTPATIENT (OUTPATIENT)
Dept: OUTPATIENT SERVICES | Facility: HOSPITAL | Age: 79
LOS: 1 days | End: 2024-02-20
Payer: MEDICARE

## 2024-02-20 ENCOUNTER — APPOINTMENT (OUTPATIENT)
Dept: RADIOLOGY | Facility: CLINIC | Age: 79
End: 2024-02-20
Payer: MEDICARE

## 2024-02-20 DIAGNOSIS — Z98.49 CATARACT EXTRACTION STATUS, UNSPECIFIED EYE: Chronic | ICD-10-CM

## 2024-02-20 DIAGNOSIS — M06.9 RHEUMATOID ARTHRITIS, UNSPECIFIED: ICD-10-CM

## 2024-02-20 PROCEDURE — 72052 X-RAY EXAM NECK SPINE 6/>VWS: CPT

## 2024-02-20 PROCEDURE — 72052 X-RAY EXAM NECK SPINE 6/>VWS: CPT | Mod: 26

## 2024-02-24 ENCOUNTER — TRANSCRIPTION ENCOUNTER (OUTPATIENT)
Age: 79
End: 2024-02-24

## 2024-02-26 ENCOUNTER — TRANSCRIPTION ENCOUNTER (OUTPATIENT)
Age: 79
End: 2024-02-26

## 2024-03-07 ENCOUNTER — TRANSCRIPTION ENCOUNTER (OUTPATIENT)
Age: 79
End: 2024-03-07

## 2024-03-11 ENCOUNTER — APPOINTMENT (OUTPATIENT)
Dept: RHEUMATOLOGY | Facility: CLINIC | Age: 79
End: 2024-03-11

## 2024-03-14 ENCOUNTER — NON-APPOINTMENT (OUTPATIENT)
Age: 79
End: 2024-03-14

## 2024-03-15 ENCOUNTER — RX RENEWAL (OUTPATIENT)
Age: 79
End: 2024-03-15

## 2024-05-29 ENCOUNTER — NON-APPOINTMENT (OUTPATIENT)
Age: 79
End: 2024-05-29

## 2024-05-29 ENCOUNTER — APPOINTMENT (OUTPATIENT)
Dept: OPHTHALMOLOGY | Facility: CLINIC | Age: 79
End: 2024-05-29
Payer: MEDICARE

## 2024-05-29 PROCEDURE — 92012 INTRM OPH EXAM EST PATIENT: CPT

## 2024-06-07 ENCOUNTER — NON-APPOINTMENT (OUTPATIENT)
Age: 79
End: 2024-06-07

## 2024-07-01 ENCOUNTER — APPOINTMENT (OUTPATIENT)
Dept: RHEUMATOLOGY | Facility: CLINIC | Age: 79
End: 2024-07-01
Payer: MEDICARE

## 2024-07-01 ENCOUNTER — MED ADMIN CHARGE (OUTPATIENT)
Age: 79
End: 2024-07-01

## 2024-07-01 VITALS
HEIGHT: 64 IN | SYSTOLIC BLOOD PRESSURE: 131 MMHG | HEART RATE: 81 BPM | OXYGEN SATURATION: 95 % | WEIGHT: 122 LBS | DIASTOLIC BLOOD PRESSURE: 79 MMHG | BODY MASS INDEX: 20.83 KG/M2

## 2024-07-01 PROCEDURE — 99214 OFFICE O/P EST MOD 30 MIN: CPT | Mod: 25

## 2024-07-01 PROCEDURE — 96372 THER/PROPH/DIAG INJ SC/IM: CPT

## 2024-07-01 PROCEDURE — ZZZZZ: CPT

## 2024-07-01 PROCEDURE — G2211 COMPLEX E/M VISIT ADD ON: CPT

## 2024-07-01 RX ADMIN — DENOSUMAB 60 MG/ML: 60 INJECTION SUBCUTANEOUS at 00:00

## 2024-07-02 ENCOUNTER — NON-APPOINTMENT (OUTPATIENT)
Age: 79
End: 2024-07-02

## 2024-07-02 RX ORDER — DENOSUMAB 60 MG/ML
60 INJECTION SUBCUTANEOUS
Qty: 1 | Refills: 0 | Status: COMPLETED | OUTPATIENT
Start: 2024-07-01

## 2024-08-07 ENCOUNTER — NON-APPOINTMENT (OUTPATIENT)
Age: 79
End: 2024-08-07

## 2024-08-07 ENCOUNTER — APPOINTMENT (OUTPATIENT)
Dept: OPHTHALMOLOGY | Facility: CLINIC | Age: 79
End: 2024-08-07

## 2024-08-07 PROCEDURE — 92012 INTRM OPH EXAM EST PATIENT: CPT

## 2024-10-10 ENCOUNTER — APPOINTMENT (OUTPATIENT)
Dept: RHEUMATOLOGY | Facility: CLINIC | Age: 79
End: 2024-10-10

## 2024-11-11 ENCOUNTER — TRANSCRIPTION ENCOUNTER (OUTPATIENT)
Age: 79
End: 2024-11-11

## 2024-11-12 ENCOUNTER — APPOINTMENT (OUTPATIENT)
Dept: RHEUMATOLOGY | Facility: CLINIC | Age: 79
End: 2024-11-12
Payer: MEDICARE

## 2024-11-12 ENCOUNTER — NON-APPOINTMENT (OUTPATIENT)
Age: 79
End: 2024-11-12

## 2024-11-12 VITALS
DIASTOLIC BLOOD PRESSURE: 71 MMHG | SYSTOLIC BLOOD PRESSURE: 148 MMHG | OXYGEN SATURATION: 98 % | HEIGHT: 64 IN | HEART RATE: 82 BPM | RESPIRATION RATE: 16 BRPM | BODY MASS INDEX: 19.46 KG/M2 | WEIGHT: 114 LBS

## 2024-11-12 DIAGNOSIS — M06.9 RHEUMATOID ARTHRITIS, UNSPECIFIED: ICD-10-CM

## 2024-11-12 PROCEDURE — 99214 OFFICE O/P EST MOD 30 MIN: CPT | Mod: GC

## 2024-11-12 PROCEDURE — G2211 COMPLEX E/M VISIT ADD ON: CPT

## 2024-11-12 RX ORDER — METHYLPREDNISOLONE 4 MG/1
4 TABLET ORAL
Qty: 45 | Refills: 0 | Status: ACTIVE | COMMUNITY
Start: 2024-11-12 | End: 1900-01-01

## 2024-11-21 ENCOUNTER — RX RENEWAL (OUTPATIENT)
Age: 79
End: 2024-11-21

## 2024-12-09 ENCOUNTER — RX RENEWAL (OUTPATIENT)
Age: 79
End: 2024-12-09

## 2025-01-02 ENCOUNTER — APPOINTMENT (OUTPATIENT)
Dept: RHEUMATOLOGY | Facility: CLINIC | Age: 80
End: 2025-01-02
Payer: MEDICARE

## 2025-01-02 ENCOUNTER — MED ADMIN CHARGE (OUTPATIENT)
Age: 80
End: 2025-01-02

## 2025-01-02 VITALS
DIASTOLIC BLOOD PRESSURE: 72 MMHG | TEMPERATURE: 97.8 F | SYSTOLIC BLOOD PRESSURE: 133 MMHG | HEART RATE: 82 BPM | RESPIRATION RATE: 16 BRPM | OXYGEN SATURATION: 98 %

## 2025-01-02 PROCEDURE — 96372 THER/PROPH/DIAG INJ SC/IM: CPT

## 2025-01-02 RX ADMIN — DENOSUMAB 0 MG/ML: 60 INJECTION SUBCUTANEOUS at 00:00

## 2025-01-03 RX ORDER — DENOSUMAB 60 MG/ML
60 INJECTION SUBCUTANEOUS
Qty: 1 | Refills: 0 | Status: COMPLETED | OUTPATIENT
Start: 2025-01-02

## 2025-02-05 ENCOUNTER — APPOINTMENT (OUTPATIENT)
Dept: OPHTHALMOLOGY | Facility: CLINIC | Age: 80
End: 2025-02-05

## 2025-02-24 ENCOUNTER — TRANSCRIPTION ENCOUNTER (OUTPATIENT)
Age: 80
End: 2025-02-24

## 2025-02-26 ENCOUNTER — TRANSCRIPTION ENCOUNTER (OUTPATIENT)
Age: 80
End: 2025-02-26

## 2025-02-27 ENCOUNTER — APPOINTMENT (OUTPATIENT)
Dept: RHEUMATOLOGY | Facility: CLINIC | Age: 80
End: 2025-02-27

## 2025-04-03 ENCOUNTER — TRANSCRIPTION ENCOUNTER (OUTPATIENT)
Age: 80
End: 2025-04-03

## 2025-04-19 ENCOUNTER — NON-APPOINTMENT (OUTPATIENT)
Age: 80
End: 2025-04-19

## 2025-04-21 ENCOUNTER — LABORATORY RESULT (OUTPATIENT)
Age: 80
End: 2025-04-21

## 2025-04-21 ENCOUNTER — APPOINTMENT (OUTPATIENT)
Dept: RHEUMATOLOGY | Facility: CLINIC | Age: 80
End: 2025-04-21
Payer: MEDICARE

## 2025-04-21 VITALS
BODY MASS INDEX: 20.83 KG/M2 | DIASTOLIC BLOOD PRESSURE: 78 MMHG | WEIGHT: 122 LBS | HEART RATE: 73 BPM | SYSTOLIC BLOOD PRESSURE: 147 MMHG | HEIGHT: 64 IN | OXYGEN SATURATION: 99 %

## 2025-04-21 PROCEDURE — G2211 COMPLEX E/M VISIT ADD ON: CPT

## 2025-04-21 PROCEDURE — 99214 OFFICE O/P EST MOD 30 MIN: CPT

## 2025-04-21 RX ORDER — METHYLPREDNISOLONE 4 MG/1
4 TABLET ORAL
Qty: 180 | Refills: 0 | Status: ACTIVE | COMMUNITY
Start: 2025-04-21 | End: 1900-01-01

## 2025-04-21 RX ORDER — HYDROXYCHLOROQUINE SULFATE 200 MG/1
200 TABLET, FILM COATED ORAL
Qty: 90 | Refills: 1 | Status: ACTIVE | COMMUNITY
Start: 2025-04-21 | End: 1900-01-01

## 2025-04-24 ENCOUNTER — TRANSCRIPTION ENCOUNTER (OUTPATIENT)
Age: 80
End: 2025-04-24

## 2025-04-24 LAB
25(OH)D3 SERPL-MCNC: 39 NG/ML
ALBUMIN SERPL ELPH-MCNC: 4.4 G/DL
ALP BLD-CCNC: 43 U/L
ALT SERPL-CCNC: 13 U/L
ANION GAP SERPL CALC-SCNC: 14 MMOL/L
AST SERPL-CCNC: 17 U/L
BASOPHILS # BLD AUTO: 0.03 K/UL
BASOPHILS NFR BLD AUTO: 0.6 %
BILIRUB SERPL-MCNC: 0.4 MG/DL
BUN SERPL-MCNC: 15 MG/DL
CALCIUM SERPL-MCNC: 9.4 MG/DL
CHLORIDE SERPL-SCNC: 105 MMOL/L
CO2 SERPL-SCNC: 24 MMOL/L
CREAT SERPL-MCNC: 0.62 MG/DL
CRP SERPL-MCNC: <3 MG/L
EGFRCR SERPLBLD CKD-EPI 2021: 91 ML/MIN/1.73M2
EOSINOPHIL # BLD AUTO: 0.12 K/UL
EOSINOPHIL NFR BLD AUTO: 2.4 %
ERYTHROCYTE [SEDIMENTATION RATE] IN BLOOD BY WESTERGREN METHOD: 10 MM/HR
GLUCOSE SERPL-MCNC: 86 MG/DL
HAV IGM SER QL: NONREACTIVE
HBV CORE IGG+IGM SER QL: NONREACTIVE
HBV CORE IGM SER QL: NONREACTIVE
HBV SURFACE AG SER QL: NONREACTIVE
HCT VFR BLD CALC: 36.7 %
HCV AB SER QL: NONREACTIVE
HCV S/CO RATIO: 0.09 S/CO
HGB BLD-MCNC: 11.4 G/DL
IMM GRANULOCYTES NFR BLD AUTO: 0.2 %
LYMPHOCYTES # BLD AUTO: 1.35 K/UL
LYMPHOCYTES NFR BLD AUTO: 26.9 %
M TB IFN-G BLD-IMP: NEGATIVE
MAN DIFF?: NORMAL
MCHC RBC-ENTMCNC: 31.1 G/DL
MCHC RBC-ENTMCNC: 32.7 PG
MCV RBC AUTO: 105.2 FL
MONOCYTES # BLD AUTO: 0.65 K/UL
MONOCYTES NFR BLD AUTO: 12.9 %
NEUTROPHILS # BLD AUTO: 2.86 K/UL
NEUTROPHILS NFR BLD AUTO: 57 %
PLATELET # BLD AUTO: 230 K/UL
POTASSIUM SERPL-SCNC: 4.6 MMOL/L
PROT SERPL-MCNC: 6.6 G/DL
QUANTIFERON TB PLUS MITOGEN MINUS NIL: >10 IU/ML
QUANTIFERON TB PLUS NIL: 0.06 IU/ML
QUANTIFERON TB PLUS TB1 MINUS NIL: 0 IU/ML
QUANTIFERON TB PLUS TB2 MINUS NIL: 0 IU/ML
RBC # BLD: 3.49 M/UL
RBC # FLD: 14.3 %
SODIUM SERPL-SCNC: 143 MMOL/L
WBC # FLD AUTO: 5.02 K/UL

## 2025-04-30 DIAGNOSIS — M06.9 RHEUMATOID ARTHRITIS, UNSPECIFIED: ICD-10-CM

## 2025-05-05 ENCOUNTER — TRANSCRIPTION ENCOUNTER (OUTPATIENT)
Age: 80
End: 2025-05-05

## 2025-05-16 ENCOUNTER — OUTPATIENT (OUTPATIENT)
Dept: OUTPATIENT SERVICES | Facility: HOSPITAL | Age: 80
LOS: 1 days | End: 2025-05-16
Payer: MEDICARE

## 2025-05-16 ENCOUNTER — APPOINTMENT (OUTPATIENT)
Dept: ULTRASOUND IMAGING | Facility: CLINIC | Age: 80
End: 2025-05-16
Payer: MEDICARE

## 2025-05-16 DIAGNOSIS — Z98.49 CATARACT EXTRACTION STATUS, UNSPECIFIED EYE: Chronic | ICD-10-CM

## 2025-05-16 PROCEDURE — 76881 US COMPL JOINT R-T W/IMG: CPT | Mod: 26,RT

## 2025-05-16 PROCEDURE — 76881 US COMPL JOINT R-T W/IMG: CPT

## 2025-05-19 ENCOUNTER — NON-APPOINTMENT (OUTPATIENT)
Age: 80
End: 2025-05-19

## 2025-05-19 ENCOUNTER — TRANSCRIPTION ENCOUNTER (OUTPATIENT)
Age: 80
End: 2025-05-19

## 2025-06-06 ENCOUNTER — TRANSCRIPTION ENCOUNTER (OUTPATIENT)
Age: 80
End: 2025-06-06

## 2025-07-19 ENCOUNTER — RX RENEWAL (OUTPATIENT)
Age: 80
End: 2025-07-19

## 2025-08-29 ENCOUNTER — APPOINTMENT (OUTPATIENT)
Dept: RHEUMATOLOGY | Facility: CLINIC | Age: 80
End: 2025-08-29

## 2025-09-16 ENCOUNTER — TRANSCRIPTION ENCOUNTER (OUTPATIENT)
Age: 80
End: 2025-09-16